# Patient Record
Sex: FEMALE | Race: WHITE | NOT HISPANIC OR LATINO | Employment: OTHER | URBAN - METROPOLITAN AREA
[De-identification: names, ages, dates, MRNs, and addresses within clinical notes are randomized per-mention and may not be internally consistent; named-entity substitution may affect disease eponyms.]

---

## 2023-04-14 ENCOUNTER — TELEPHONE (OUTPATIENT)
Dept: PRIMARY CARE | Facility: CLINIC | Age: 73
End: 2023-04-14
Payer: MEDICARE

## 2023-07-13 ENCOUNTER — APPOINTMENT (OUTPATIENT)
Dept: PRIMARY CARE | Facility: CLINIC | Age: 73
End: 2023-07-13
Payer: MEDICARE

## 2023-07-26 ENCOUNTER — OFFICE VISIT (OUTPATIENT)
Dept: PRIMARY CARE | Facility: CLINIC | Age: 73
End: 2023-07-26
Payer: MEDICARE

## 2023-07-26 VITALS
BODY MASS INDEX: 50.26 KG/M2 | HEART RATE: 85 BPM | DIASTOLIC BLOOD PRESSURE: 70 MMHG | WEIGHT: 266 LBS | SYSTOLIC BLOOD PRESSURE: 124 MMHG

## 2023-07-26 DIAGNOSIS — G40.909 SEIZURE DISORDER (MULTI): ICD-10-CM

## 2023-07-26 DIAGNOSIS — F11.20 CONTINUOUS OPIOID DEPENDENCE (MULTI): ICD-10-CM

## 2023-07-26 DIAGNOSIS — G43.719 INTRACTABLE CHRONIC MIGRAINE WITHOUT AURA AND WITHOUT STATUS MIGRAINOSUS: ICD-10-CM

## 2023-07-26 DIAGNOSIS — Z12.31 ENCOUNTER FOR SCREENING MAMMOGRAM FOR MALIGNANT NEOPLASM OF BREAST: ICD-10-CM

## 2023-07-26 DIAGNOSIS — E66.01 OBESITY, MORBID (MULTI): Primary | ICD-10-CM

## 2023-07-26 PROBLEM — J30.9 ALLERGIC RHINITIS: Status: ACTIVE | Noted: 2023-07-26

## 2023-07-26 PROBLEM — G47.00 INSOMNIA: Status: ACTIVE | Noted: 2023-07-26

## 2023-07-26 PROBLEM — N32.81 OVERACTIVE BLADDER: Status: ACTIVE | Noted: 2023-07-26

## 2023-07-26 PROBLEM — R60.9 EDEMA: Status: ACTIVE | Noted: 2023-07-26

## 2023-07-26 PROBLEM — B37.2 INTERTRIGINOUS CANDIDIASIS: Status: ACTIVE | Noted: 2023-07-26

## 2023-07-26 PROBLEM — F41.9 ANXIETY DISORDER: Status: ACTIVE | Noted: 2023-07-26

## 2023-07-26 PROBLEM — M85.80 OSTEOPENIA: Status: ACTIVE | Noted: 2023-07-26

## 2023-07-26 PROBLEM — E55.9 VITAMIN D DEFICIENCY: Status: ACTIVE | Noted: 2023-07-26

## 2023-07-26 PROBLEM — M54.16 RIGHT LUMBAR RADICULOPATHY: Status: ACTIVE | Noted: 2023-07-26

## 2023-07-26 PROBLEM — M16.10 ARTHRITIS, HIP: Status: ACTIVE | Noted: 2023-07-26

## 2023-07-26 PROBLEM — I87.8 VENOUS STASIS: Status: ACTIVE | Noted: 2023-07-26

## 2023-07-26 PROBLEM — G89.29 CHRONIC LEFT SHOULDER PAIN: Status: ACTIVE | Noted: 2023-07-26

## 2023-07-26 PROBLEM — E78.5 HYPERLIPIDEMIA LDL GOAL <100: Status: ACTIVE | Noted: 2023-07-26

## 2023-07-26 PROBLEM — E03.9 HYPOTHYROIDISM: Status: ACTIVE | Noted: 2023-07-26

## 2023-07-26 PROBLEM — F32.A DEPRESSION: Status: ACTIVE | Noted: 2023-07-26

## 2023-07-26 PROBLEM — L30.9 ECZEMA: Status: ACTIVE | Noted: 2023-07-26

## 2023-07-26 PROBLEM — G25.81 RESTLESS LEGS SYNDROME: Status: ACTIVE | Noted: 2023-07-26

## 2023-07-26 PROBLEM — J38.3 VOCAL CORD DYSFUNCTION: Status: ACTIVE | Noted: 2023-07-26

## 2023-07-26 PROBLEM — K44.9 HIATAL HERNIA: Status: ACTIVE | Noted: 2023-07-26

## 2023-07-26 PROBLEM — G95.9 CERVICAL MYELOPATHY (MULTI): Status: ACTIVE | Noted: 2023-07-26

## 2023-07-26 PROBLEM — M25.512 CHRONIC LEFT SHOULDER PAIN: Status: ACTIVE | Noted: 2023-07-26

## 2023-07-26 PROBLEM — R42 VERTIGO: Status: ACTIVE | Noted: 2023-07-26

## 2023-07-26 PROCEDURE — 1159F MED LIST DOCD IN RCRD: CPT | Performed by: NURSE PRACTITIONER

## 2023-07-26 PROCEDURE — 99212 OFFICE O/P EST SF 10 MIN: CPT | Performed by: NURSE PRACTITIONER

## 2023-07-26 PROCEDURE — 1157F ADVNC CARE PLAN IN RCRD: CPT | Performed by: NURSE PRACTITIONER

## 2023-07-26 PROCEDURE — 1036F TOBACCO NON-USER: CPT | Performed by: NURSE PRACTITIONER

## 2023-07-26 PROCEDURE — 1126F AMNT PAIN NOTED NONE PRSNT: CPT | Performed by: NURSE PRACTITIONER

## 2023-07-26 PROCEDURE — 1160F RVW MEDS BY RX/DR IN RCRD: CPT | Performed by: NURSE PRACTITIONER

## 2023-07-26 RX ORDER — ACETAMINOPHEN 500 MG
TABLET ORAL
COMMUNITY
Start: 2019-05-08 | End: 2023-11-01 | Stop reason: WASHOUT

## 2023-07-26 RX ORDER — AMITRIPTYLINE HYDROCHLORIDE 25 MG/1
50 TABLET, FILM COATED ORAL NIGHTLY
COMMUNITY
End: 2023-10-13 | Stop reason: SDUPTHER

## 2023-07-26 RX ORDER — FUROSEMIDE 40 MG/1
TABLET ORAL
COMMUNITY
Start: 2012-02-20 | End: 2023-11-01 | Stop reason: SDUPTHER

## 2023-07-26 RX ORDER — ASPIRIN 81 MG/1
81 TABLET ORAL
COMMUNITY
Start: 2013-10-17 | End: 2023-11-01 | Stop reason: WASHOUT

## 2023-07-26 RX ORDER — LEVOTHYROXINE SODIUM 137 UG/1
TABLET ORAL
COMMUNITY
Start: 2012-05-12 | End: 2023-11-01 | Stop reason: WASHOUT

## 2023-07-26 RX ORDER — KETOROLAC TROMETHAMINE 10 MG/1
TABLET, FILM COATED ORAL
COMMUNITY

## 2023-07-26 RX ORDER — TIZANIDINE 4 MG/1
TABLET ORAL
COMMUNITY
Start: 2022-03-25 | End: 2023-11-20 | Stop reason: SDUPTHER

## 2023-07-26 RX ORDER — CETIRIZINE HYDROCHLORIDE 10 MG/1
10 TABLET ORAL DAILY
COMMUNITY
End: 2024-02-06 | Stop reason: SDUPTHER

## 2023-07-26 RX ORDER — DICYCLOMINE HYDROCHLORIDE 10 MG/1
CAPSULE ORAL
COMMUNITY
Start: 2022-03-24 | End: 2023-12-20 | Stop reason: SDUPTHER

## 2023-07-26 RX ORDER — SERTRALINE HYDROCHLORIDE 25 MG/1
25 TABLET, FILM COATED ORAL NIGHTLY
COMMUNITY
End: 2024-01-31 | Stop reason: SDUPTHER

## 2023-07-26 RX ORDER — OXYBUTYNIN CHLORIDE 5 MG/1
5 TABLET ORAL 3 TIMES DAILY
COMMUNITY
End: 2024-02-01 | Stop reason: SDUPTHER

## 2023-07-26 RX ORDER — KETOROLAC TROMETHAMINE 15.75 MG/1
1 SPRAY, METERED NASAL
COMMUNITY
Start: 2022-07-22 | End: 2024-03-15

## 2023-07-26 RX ORDER — KETOCONAZOLE 20 MG/G
CREAM TOPICAL
COMMUNITY
End: 2023-11-01 | Stop reason: WASHOUT

## 2023-07-26 RX ORDER — ROSUVASTATIN CALCIUM 20 MG/1
1 TABLET, COATED ORAL DAILY
COMMUNITY
Start: 2020-11-05 | End: 2023-11-01 | Stop reason: WASHOUT

## 2023-07-26 RX ORDER — UBROGEPANT 100 MG/1
TABLET ORAL
COMMUNITY
Start: 2022-03-25 | End: 2023-11-01 | Stop reason: SDUPTHER

## 2023-07-26 RX ORDER — TRIAMCINOLONE ACETONIDE 1 MG/G
CREAM TOPICAL 3 TIMES DAILY PRN
COMMUNITY

## 2023-07-26 RX ORDER — TRAMADOL HYDROCHLORIDE 50 MG/1
TABLET ORAL
COMMUNITY
Start: 2022-08-11

## 2023-07-26 RX ORDER — MUPIROCIN 20 MG/G
OINTMENT TOPICAL
COMMUNITY
Start: 2023-01-17 | End: 2023-07-27 | Stop reason: SDUPTHER

## 2023-07-26 RX ORDER — POTASSIUM CHLORIDE 750 MG/1
TABLET, FILM COATED, EXTENDED RELEASE ORAL
COMMUNITY
Start: 2013-10-17 | End: 2023-11-01 | Stop reason: WASHOUT

## 2023-07-26 RX ORDER — MULTIVITAMIN
TABLET ORAL
COMMUNITY
Start: 2021-03-03

## 2023-07-26 RX ORDER — ACETAZOLAMIDE 250 MG/1
250 TABLET ORAL NIGHTLY
COMMUNITY
End: 2024-02-21 | Stop reason: SDUPTHER

## 2023-07-26 RX ORDER — LANOLIN ALCOHOL/MO/W.PET/CERES
1 CREAM (GRAM) TOPICAL DAILY
COMMUNITY
Start: 2019-05-08

## 2023-07-26 RX ORDER — ROPINIROLE 3 MG/1
TABLET, FILM COATED ORAL 2 TIMES DAILY
COMMUNITY
Start: 2012-08-07 | End: 2023-09-06 | Stop reason: SDUPTHER

## 2023-07-26 NOTE — PROGRESS NOTES
Subjective   Chief Complaint   Patient presents with    Follow-up     Khadra is here today for F/U       Patient ID: Khadra Earl is a 72 y.o. female who presents for Follow-up (Khadra is here today for F/U).    HPI  Est 71 yo female presents today for hospital d/c follow up    Pt states she went to [facility] ED on 6/29/23 for c/o muscle tightness on both sides of her neck and headache. She says she was scared b/c she could not move her head  She was given Benadryl, Toradol and Zofran IV   CT head/spine---> NEGATIVE   Neuro consulted while in ED, Dr. Ponce and pt was cleared for home  ED chart reviewed with pt during OV today     She reports today that her symptoms have been controlled  She states 1 other occurrence and she took a hot shower and it relaxed her muscles   She is currently using Ubrelvy and Sprix PRN for migraines     Pt requesting refill on a cream, but not sure which one, so she will call with the name of it.       Review of Systems  All 13 systems were reviewed and are within normal limits except positive and pertinent negative responses which are noted below or in HPI.      Objective   /70   Pulse 85   Wt 121 kg (266 lb)   BMI 50.26 kg/m²        Physical Exam  Vitals reviewed.   Constitutional:       Appearance: She is obese.   Cardiovascular:      Pulses: Normal pulses.   Pulmonary:      Effort: Pulmonary effort is normal.   Skin:     Capillary Refill: Capillary refill takes less than 2 seconds.   Psychiatric:         Mood and Affect: Mood normal.         Assessment/Plan   Problem List Items Addressed This Visit       Obesity, morbid (CMS/HCC) - Primary     Other Visit Diagnoses       Encounter for screening mammogram for malignant neoplasm of breast        Relevant Orders    BI mammo bilateral screening tomosynthesis

## 2023-07-27 DIAGNOSIS — L30.9 ECZEMA, UNSPECIFIED TYPE: ICD-10-CM

## 2023-07-27 RX ORDER — MUPIROCIN 20 MG/G
OINTMENT TOPICAL
Qty: 30 G | Refills: 1 | Status: SHIPPED | OUTPATIENT
Start: 2023-07-27

## 2023-09-06 DIAGNOSIS — G47.00 INSOMNIA, UNSPECIFIED TYPE: ICD-10-CM

## 2023-09-06 RX ORDER — ROPINIROLE 3 MG/1
3 TABLET, FILM COATED ORAL NIGHTLY
Qty: 90 TABLET | Refills: 0 | Status: SHIPPED | OUTPATIENT
Start: 2023-09-06 | End: 2023-11-01 | Stop reason: SDUPTHER

## 2023-09-10 PROBLEM — D22.71 MELANOCYTIC NEVI OF RIGHT LOWER LIMB, INCLUDING HIP: Status: ACTIVE | Noted: 2022-03-09

## 2023-09-10 PROBLEM — L90.5 SCAR CONDITION AND FIBROSIS OF SKIN: Status: ACTIVE | Noted: 2022-03-09

## 2023-09-10 PROBLEM — R11.10 REGURGITATION OF FOOD: Status: ACTIVE | Noted: 2023-09-10

## 2023-09-10 PROBLEM — L50.3 DERMATOGRAPHIC URTICARIA: Status: ACTIVE | Noted: 2022-03-09

## 2023-09-10 PROBLEM — G93.2 IIH (IDIOPATHIC INTRACRANIAL HYPERTENSION): Status: ACTIVE | Noted: 2023-09-10

## 2023-09-10 PROBLEM — D22.20 MELANOCYTIC NEVI OF UNSPECIFIED EAR AND EXTERNAL AURICULAR CANAL: Status: ACTIVE | Noted: 2022-03-09

## 2023-09-10 PROBLEM — R40.1 CLOUDED CONSCIOUSNESS: Status: ACTIVE | Noted: 2023-09-10

## 2023-09-10 PROBLEM — D22.30 MELANOCYTIC NEVI OF UNSPECIFIED PART OF FACE: Status: ACTIVE | Noted: 2022-03-09

## 2023-09-10 PROBLEM — K92.0 HEMATEMESIS: Status: ACTIVE | Noted: 2023-09-10

## 2023-09-10 PROBLEM — R13.12 DYSPHAGIA, OROPHARYNGEAL PHASE: Status: ACTIVE | Noted: 2023-09-10

## 2023-09-10 PROBLEM — R07.9 CHEST PAIN: Status: ACTIVE | Noted: 2023-09-10

## 2023-09-10 PROBLEM — L91.8 OTHER HYPERTROPHIC DISORDERS OF THE SKIN: Status: ACTIVE | Noted: 2022-03-09

## 2023-09-10 PROBLEM — L30.4 ERYTHEMA INTERTRIGO: Status: ACTIVE | Noted: 2022-03-09

## 2023-09-10 PROBLEM — Z86.59 HISTORY OF DEPRESSION: Status: ACTIVE | Noted: 2023-09-10

## 2023-09-10 PROBLEM — L28.1 PRURIGO NODULARIS: Status: ACTIVE | Noted: 2022-03-09

## 2023-09-10 PROBLEM — M54.50 ACUTE BILATERAL LOW BACK PAIN: Status: ACTIVE | Noted: 2023-09-10

## 2023-09-10 PROBLEM — R41.82 ALTERED MENTAL STATUS: Status: ACTIVE | Noted: 2023-09-10

## 2023-09-10 PROBLEM — K20.90 ESOPHAGITIS: Status: ACTIVE | Noted: 2023-09-10

## 2023-09-10 PROBLEM — R59.0 CERVICAL LYMPHADENOPATHY: Status: ACTIVE | Noted: 2023-09-10

## 2023-09-10 PROBLEM — Z85.820 HISTORY OF MALIGNANT MELANOMA: Status: ACTIVE | Noted: 2022-03-09

## 2023-09-10 PROBLEM — M79.89 PAIN AND SWELLING OF LEFT LOWER LEG: Status: ACTIVE | Noted: 2023-09-10
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
## 2023-09-10 PROBLEM — L30.9 DERMATITIS, UNSPECIFIED: Status: ACTIVE | Noted: 2022-03-09

## 2023-09-10 PROBLEM — R49.0 HOARSENESS: Status: ACTIVE | Noted: 2023-09-10

## 2023-09-10 PROBLEM — K59.00 CONSTIPATION: Status: ACTIVE | Noted: 2023-09-10

## 2023-09-10 PROBLEM — L57.0 ACTINIC KERATOSIS: Status: ACTIVE | Noted: 2022-03-09

## 2023-09-10 PROBLEM — R51.9 HEADACHE: Status: ACTIVE | Noted: 2023-09-10

## 2023-09-10 PROBLEM — D23.9 OTHER BENIGN NEOPLASM OF SKIN, UNSPECIFIED: Status: ACTIVE | Noted: 2022-03-09

## 2023-09-10 PROBLEM — I83.10 VARICOSE VEINS OF UNSPECIFIED LOWER EXTREMITY WITH INFLAMMATION: Status: ACTIVE | Noted: 2022-03-09

## 2023-09-10 PROBLEM — I89.0 LYMPHEDEMA: Status: ACTIVE | Noted: 2023-09-10

## 2023-09-10 PROBLEM — F44.5 PSYCHOGENIC NONEPILEPTIC SEIZURE: Status: ACTIVE | Noted: 2023-09-10

## 2023-09-10 PROBLEM — R21 RASH AND OTHER NONSPECIFIC SKIN ERUPTION: Status: ACTIVE | Noted: 2022-03-09

## 2023-09-10 PROBLEM — I87.2 VENOUS INSUFFICIENCY: Status: ACTIVE | Noted: 2023-09-10

## 2023-09-10 PROBLEM — M25.569 KNEE PAIN: Status: ACTIVE | Noted: 2023-09-10

## 2023-09-10 PROBLEM — J04.0 LARYNGITIS: Status: ACTIVE | Noted: 2023-09-10

## 2023-09-10 PROBLEM — K92.1 HEMATOCHEZIA: Status: ACTIVE | Noted: 2023-09-10

## 2023-09-10 PROBLEM — S39.012A STRAIN OF LUMBAR REGION: Status: ACTIVE | Noted: 2023-09-10

## 2023-09-10 PROBLEM — R60.9 LIPEDEMA: Status: ACTIVE | Noted: 2023-09-10

## 2023-09-10 PROBLEM — M79.89 PAIN AND SWELLING OF RIGHT LOWER LEG: Status: ACTIVE | Noted: 2023-09-10

## 2023-09-10 PROBLEM — Q82.8 OTHER SPECIFIED CONGENITAL MALFORMATIONS OF SKIN: Status: ACTIVE | Noted: 2022-03-09

## 2023-09-10 PROBLEM — D22.5 MELANOCYTIC NEVI OF TRUNK: Status: ACTIVE | Noted: 2022-03-09

## 2023-09-10 PROBLEM — D22.60 MELANOCYTIC NEVI OF UNSPECIFIED UPPER LIMB, INCLUDING SHOULDER: Status: ACTIVE | Noted: 2022-03-09

## 2023-09-10 PROBLEM — Z79.899 POLYPHARMACY: Status: ACTIVE | Noted: 2023-09-10

## 2023-09-10 PROBLEM — M54.32 LEFT SIDED SCIATICA: Status: ACTIVE | Noted: 2023-09-10

## 2023-09-10 PROBLEM — I87.323 CHRONIC VENOUS HYPERTENSION (IDIOPATHIC) WITH INFLAMMATION OF BILATERAL LOWER EXTREMITY: Status: ACTIVE | Noted: 2022-03-09

## 2023-09-10 PROBLEM — M25.579 ANKLE PAIN: Status: ACTIVE | Noted: 2023-09-10

## 2023-09-10 PROBLEM — S99.919A INJURY OF ANKLE: Status: ACTIVE | Noted: 2023-09-10

## 2023-09-10 PROBLEM — L67.8 ABNORMAL FACIAL HAIR: Status: ACTIVE | Noted: 2023-09-10

## 2023-09-10 PROBLEM — D22.4 MELANOCYTIC NEVI OF SCALP AND NECK: Status: ACTIVE | Noted: 2022-03-09

## 2023-09-10 PROBLEM — L82.1 OTHER SEBORRHEIC KERATOSIS: Status: ACTIVE | Noted: 2022-03-09

## 2023-09-10 PROBLEM — M79.661 PAIN AND SWELLING OF RIGHT LOWER LEG: Status: ACTIVE | Noted: 2023-09-10

## 2023-09-10 PROBLEM — L81.4 OTHER MELANIN HYPERPIGMENTATION: Status: ACTIVE | Noted: 2022-03-09

## 2023-09-10 PROBLEM — M79.18 MYOFASCIAL PAIN: Status: ACTIVE | Noted: 2023-09-10

## 2023-09-10 PROBLEM — M54.12 CHRONIC CERVICAL RADICULOPATHY: Status: ACTIVE | Noted: 2023-09-10

## 2023-09-10 PROBLEM — S39.92XA LOWER BACK INJURY: Status: ACTIVE | Noted: 2023-09-10

## 2023-09-10 PROBLEM — R05.8 DRY COUGH: Status: ACTIVE | Noted: 2023-09-10

## 2023-09-10 PROBLEM — R53.83 LETHARGY: Status: ACTIVE | Noted: 2023-09-10

## 2023-09-10 PROBLEM — M53.3 SACROILIAC JOINT DYSFUNCTION OF LEFT SIDE: Status: ACTIVE | Noted: 2023-09-10

## 2023-09-10 PROBLEM — F09 COGNITIVE DYSFUNCTION: Status: ACTIVE | Noted: 2023-09-10

## 2023-09-10 PROBLEM — M79.662 PAIN AND SWELLING OF LEFT LOWER LEG: Status: ACTIVE | Noted: 2023-09-10

## 2023-09-10 PROBLEM — D18.01 HEMANGIOMA OF SKIN AND SUBCUTANEOUS TISSUE: Status: ACTIVE | Noted: 2022-03-09

## 2023-09-10 RX ORDER — BACLOFEN 10 MG/1
10 TABLET ORAL AS NEEDED
COMMUNITY

## 2023-09-10 RX ORDER — DEXLANSOPRAZOLE 60 MG/1
60 CAPSULE, DELAYED RELEASE ORAL DAILY
COMMUNITY
End: 2023-11-01 | Stop reason: WASHOUT

## 2023-09-10 RX ORDER — ACETAMINOPHEN 325 MG/1
3 TABLET ORAL EVERY 8 HOURS
COMMUNITY
Start: 2023-01-17 | End: 2023-11-01 | Stop reason: WASHOUT

## 2023-09-10 RX ORDER — ROPINIROLE 2 MG/1
2 TABLET, FILM COATED ORAL DAILY
COMMUNITY
End: 2023-11-14 | Stop reason: ALTCHOICE

## 2023-09-10 RX ORDER — LAMOTRIGINE 25(42)-100
KIT ORAL
COMMUNITY
Start: 2023-07-28 | End: 2023-11-01 | Stop reason: WASHOUT

## 2023-09-10 RX ORDER — LOVASTATIN 40 MG/1
40 TABLET ORAL DAILY
COMMUNITY

## 2023-09-10 RX ORDER — LEVOTHYROXINE SODIUM 125 UG/1
125 TABLET ORAL DAILY
COMMUNITY
End: 2024-02-19 | Stop reason: SDUPTHER

## 2023-09-10 RX ORDER — ROPINIROLE 3 MG/1
1 TABLET, FILM COATED ORAL EVERY 8 HOURS
COMMUNITY
Start: 2012-08-07 | End: 2023-11-14 | Stop reason: SDUPTHER

## 2023-09-10 RX ORDER — LAMOTRIGINE 100 MG/1
100 TABLET ORAL DAILY
COMMUNITY
End: 2023-11-01 | Stop reason: WASHOUT

## 2023-09-10 RX ORDER — KETOCONAZOLE 20 MG/G
1 CREAM TOPICAL DAILY
COMMUNITY

## 2023-09-10 RX ORDER — POTASSIUM CHLORIDE 750 MG/1
1 TABLET, FILM COATED, EXTENDED RELEASE ORAL 3 TIMES DAILY
COMMUNITY
Start: 2020-01-27 | End: 2023-11-28 | Stop reason: SDUPTHER

## 2023-09-10 RX ORDER — HYDROCODONE BITARTRATE AND ACETAMINOPHEN 10; 325 MG/1; MG/1
1 TABLET ORAL AS NEEDED
COMMUNITY
End: 2023-11-01 | Stop reason: WASHOUT

## 2023-09-10 RX ORDER — POTASSIUM CHLORIDE 600 MG/1
8 TABLET, FILM COATED, EXTENDED RELEASE ORAL 2 TIMES DAILY
COMMUNITY
End: 2023-11-01 | Stop reason: WASHOUT

## 2023-09-10 RX ORDER — BENZONATATE 100 MG/1
100 CAPSULE ORAL AS NEEDED
COMMUNITY

## 2023-09-10 RX ORDER — FLUOCINONIDE 0.5 MG/G
1 CREAM TOPICAL
COMMUNITY
Start: 2018-04-03

## 2023-09-10 RX ORDER — FUROSEMIDE 40 MG/1
20 TABLET ORAL DAILY PRN
COMMUNITY
Start: 2012-02-20 | End: 2023-11-01 | Stop reason: SDUPTHER

## 2023-09-10 RX ORDER — MECLIZINE HYDROCHLORIDE 25 MG/1
25 TABLET ORAL EVERY 8 HOURS PRN
COMMUNITY

## 2023-09-26 DIAGNOSIS — G43.711 CHRONIC MIGRAINE WITHOUT AURA, WITH INTRACTABLE MIGRAINE, SO STATED, WITH STATUS MIGRAINOSUS: Primary | ICD-10-CM

## 2023-09-26 RX ORDER — METHYLPREDNISOLONE SODIUM SUCCINATE 40 MG/ML
40 INJECTION INTRAMUSCULAR; INTRAVENOUS AS NEEDED
Status: CANCELLED | OUTPATIENT
Start: 2023-10-01

## 2023-09-26 RX ORDER — DIPHENHYDRAMINE HYDROCHLORIDE 50 MG/ML
50 INJECTION INTRAMUSCULAR; INTRAVENOUS AS NEEDED
Status: CANCELLED | OUTPATIENT
Start: 2023-10-01

## 2023-09-26 RX ORDER — EPINEPHRINE 0.3 MG/.3ML
0.3 INJECTION SUBCUTANEOUS EVERY 5 MIN PRN
Status: CANCELLED | OUTPATIENT
Start: 2023-10-01

## 2023-09-26 RX ORDER — ALBUTEROL SULFATE 0.83 MG/ML
3 SOLUTION RESPIRATORY (INHALATION) AS NEEDED
Status: CANCELLED | OUTPATIENT
Start: 2023-10-01

## 2023-09-26 RX ORDER — FAMOTIDINE 10 MG/ML
20 INJECTION INTRAVENOUS ONCE AS NEEDED
Status: CANCELLED | OUTPATIENT
Start: 2023-10-01

## 2023-10-03 PROBLEM — G43.119 INTRACTABLE MIGRAINE WITH AURA WITHOUT STATUS MIGRAINOSUS: Status: ACTIVE | Noted: 2023-10-03

## 2023-10-03 RX ORDER — HEPARIN 100 UNIT/ML
500 SYRINGE INTRAVENOUS AS NEEDED
Status: CANCELLED | OUTPATIENT
Start: 2023-10-03

## 2023-10-03 RX ORDER — HEPARIN SODIUM,PORCINE/PF 10 UNIT/ML
50 SYRINGE (ML) INTRAVENOUS AS NEEDED
Status: CANCELLED | OUTPATIENT
Start: 2023-10-03

## 2023-10-13 ENCOUNTER — INFUSION (OUTPATIENT)
Dept: HEMATOLOGY/ONCOLOGY | Facility: CLINIC | Age: 73
End: 2023-10-13
Payer: MEDICARE

## 2023-10-13 ENCOUNTER — HOSPITAL ENCOUNTER (OUTPATIENT)
Dept: RADIOLOGY | Facility: HOSPITAL | Age: 73
Discharge: HOME | End: 2023-10-13
Payer: MEDICARE

## 2023-10-13 VITALS
HEIGHT: 62 IN | TEMPERATURE: 98.2 F | DIASTOLIC BLOOD PRESSURE: 82 MMHG | SYSTOLIC BLOOD PRESSURE: 139 MMHG | BODY MASS INDEX: 51.97 KG/M2 | RESPIRATION RATE: 16 BRPM | WEIGHT: 282.41 LBS | HEART RATE: 56 BPM | OXYGEN SATURATION: 100 %

## 2023-10-13 DIAGNOSIS — G43.711 INTRACTABLE CHRONIC MIGRAINE WITHOUT AURA AND WITH STATUS MIGRAINOSUS: ICD-10-CM

## 2023-10-13 DIAGNOSIS — Z12.31 ENCOUNTER FOR SCREENING MAMMOGRAM FOR MALIGNANT NEOPLASM OF BREAST: ICD-10-CM

## 2023-10-13 DIAGNOSIS — G43.119 INTRACTABLE MIGRAINE WITH AURA WITHOUT STATUS MIGRAINOSUS: ICD-10-CM

## 2023-10-13 DIAGNOSIS — G43.711 CHRONIC MIGRAINE WITHOUT AURA, WITH INTRACTABLE MIGRAINE, SO STATED, WITH STATUS MIGRAINOSUS: ICD-10-CM

## 2023-10-13 PROCEDURE — 77063 BREAST TOMOSYNTHESIS BI: CPT | Mod: BILATERAL PROCEDURE | Performed by: RADIOLOGY

## 2023-10-13 PROCEDURE — 2500000004 HC RX 250 GENERAL PHARMACY W/ HCPCS (ALT 636 FOR OP/ED)

## 2023-10-13 PROCEDURE — 77067 SCR MAMMO BI INCL CAD: CPT | Mod: BILATERAL PROCEDURE | Performed by: RADIOLOGY

## 2023-10-13 PROCEDURE — 96365 THER/PROPH/DIAG IV INF INIT: CPT

## 2023-10-13 PROCEDURE — 77063 BREAST TOMOSYNTHESIS BI: CPT | Mod: 50

## 2023-10-13 RX ORDER — DIPHENHYDRAMINE HYDROCHLORIDE 50 MG/ML
50 INJECTION INTRAMUSCULAR; INTRAVENOUS AS NEEDED
Status: CANCELLED | OUTPATIENT
Start: 2024-01-11

## 2023-10-13 RX ORDER — EPINEPHRINE 0.3 MG/.3ML
0.3 INJECTION SUBCUTANEOUS EVERY 5 MIN PRN
Status: DISCONTINUED | OUTPATIENT
Start: 2023-10-13 | End: 2023-10-13 | Stop reason: HOSPADM

## 2023-10-13 RX ORDER — ALBUTEROL SULFATE 0.83 MG/ML
3 SOLUTION RESPIRATORY (INHALATION) AS NEEDED
Status: DISCONTINUED | OUTPATIENT
Start: 2023-10-13 | End: 2023-10-13 | Stop reason: HOSPADM

## 2023-10-13 RX ORDER — ALBUTEROL SULFATE 0.83 MG/ML
3 SOLUTION RESPIRATORY (INHALATION) AS NEEDED
Status: CANCELLED | OUTPATIENT
Start: 2024-01-11

## 2023-10-13 RX ORDER — AMITRIPTYLINE HYDROCHLORIDE 25 MG/1
50 TABLET, FILM COATED ORAL NIGHTLY
Qty: 180 TABLET | Refills: 0 | Status: SHIPPED | OUTPATIENT
Start: 2023-10-13 | End: 2024-05-20 | Stop reason: SDUPTHER

## 2023-10-13 RX ORDER — DIPHENHYDRAMINE HYDROCHLORIDE 50 MG/ML
50 INJECTION INTRAMUSCULAR; INTRAVENOUS AS NEEDED
Status: DISCONTINUED | OUTPATIENT
Start: 2023-10-13 | End: 2023-10-13 | Stop reason: HOSPADM

## 2023-10-13 RX ORDER — FAMOTIDINE 10 MG/ML
20 INJECTION INTRAVENOUS ONCE AS NEEDED
Status: CANCELLED | OUTPATIENT
Start: 2024-01-11

## 2023-10-13 RX ORDER — HEPARIN 100 UNIT/ML
500 SYRINGE INTRAVENOUS AS NEEDED
Status: CANCELLED | OUTPATIENT
Start: 2023-10-13

## 2023-10-13 RX ORDER — EPINEPHRINE 0.3 MG/.3ML
0.3 INJECTION SUBCUTANEOUS EVERY 5 MIN PRN
Status: CANCELLED | OUTPATIENT
Start: 2024-01-11

## 2023-10-13 RX ORDER — FAMOTIDINE 10 MG/ML
20 INJECTION INTRAVENOUS ONCE AS NEEDED
Status: DISCONTINUED | OUTPATIENT
Start: 2023-10-13 | End: 2023-10-13 | Stop reason: HOSPADM

## 2023-10-13 RX ORDER — HEPARIN SODIUM,PORCINE/PF 10 UNIT/ML
50 SYRINGE (ML) INTRAVENOUS AS NEEDED
Status: CANCELLED | OUTPATIENT
Start: 2023-10-13

## 2023-10-13 RX ADMIN — EPTINEZUMAB-JJMR 300 MG: 100 INJECTION INTRAVENOUS at 15:09

## 2023-10-13 ASSESSMENT — ENCOUNTER SYMPTOMS
LOSS OF SENSATION IN FEET: 0
DEPRESSION: 0
OCCASIONAL FEELINGS OF UNSTEADINESS: 1

## 2023-10-13 ASSESSMENT — PAIN SCALES - GENERAL: PAINLEVEL: 8

## 2023-10-13 ASSESSMENT — COLUMBIA-SUICIDE SEVERITY RATING SCALE - C-SSRS
6. HAVE YOU EVER DONE ANYTHING, STARTED TO DO ANYTHING, OR PREPARED TO DO ANYTHING TO END YOUR LIFE?: NO
1. IN THE PAST MONTH, HAVE YOU WISHED YOU WERE DEAD OR WISHED YOU COULD GO TO SLEEP AND NOT WAKE UP?: NO
2. HAVE YOU ACTUALLY HAD ANY THOUGHTS OF KILLING YOURSELF?: NO

## 2023-10-13 ASSESSMENT — PATIENT HEALTH QUESTIONNAIRE - PHQ9
2. FEELING DOWN, DEPRESSED OR HOPELESS: NOT AT ALL
SUM OF ALL RESPONSES TO PHQ9 QUESTIONS 1 AND 2: 0
1. LITTLE INTEREST OR PLEASURE IN DOING THINGS: NOT AT ALL

## 2023-10-31 NOTE — PROGRESS NOTES
"Subjective   Reason for Visit: Khadra Earl is an 72 y.o. female here for a Medicare Wellness visit.     Past Medical, Surgical, and Family History reviewed and updated in chart.    Reviewed all medications by prescribing practitioner or clinical pharmacist (such as prescriptions, OTCs, herbal therapies and supplements) and documented in the medical record.    HPI  71 yo est female presents today for AWV and routine f/u    PMH: ANXIETY, DEPRESSION, LYMPHEDEMA, HYPOTHYROIDISM, RLS, MIGRAINES, SEIZURES     Pt will be seeing ortho next week for her neck     Pt denies any acute c/o today, doing pretty well  No falls   Refuses flu vaccine today      #ANXIETY/DEPRESSION  Rx Amitriptyline  Denies SI/HI      #SEIZURES  Neuro: Dr. York   Rx Divalproex 750 mg daily  Last sz: 2 weeks ago      #LYMPHEDEMA  Saw Dr. Mohseni  Does compression treatments at home 2 times/day      #HYPOTHYROIDISM  TSH=5.16 Jan 2023  Rx Levothyroxine 125 mcg x 6 days/week      #MIGRAINES  Neuro: Dr. York; had injection last month   Rx Ubrelvy, Tizanidine, Sprix nasal     Patient Care Team:  JAVIER Armijo as PCP - General (Family Medicine)  [facility]ributed [facility]  Tre De Leon MD (Internal Medicine)       Review of Systems  All 13 systems were reviewed and are within normal limits except positive and pertinent negative responses which are noted below or in HPI.      Objective   Vitals:  /76   Pulse 77   Ht 1.549 m (5' 1\")   Wt 129 kg (284 lb)   BMI 53.66 kg/m²       Physical Exam  Vitals reviewed.   Constitutional:       Appearance: She is obese.   Cardiovascular:      Rate and Rhythm: Normal rate.      Pulses: Normal pulses.      Heart sounds: Normal heart sounds.   Skin:     General: Skin is warm and dry.   Psychiatric:         Mood and Affect: Mood normal.         Assessment/Plan     Problem List Items Addressed This Visit             ICD-10-CM    Edema R60.9    Relevant " Medications    furosemide (Lasix) 40 mg tablet     Other Visit Diagnoses         Codes    Medicare annual wellness visit, subsequent    -  Primary Z00.00    Routine general medical examination at health care facility     Z00.00

## 2023-11-01 ENCOUNTER — OFFICE VISIT (OUTPATIENT)
Dept: PRIMARY CARE | Facility: CLINIC | Age: 73
End: 2023-11-01
Payer: MEDICARE

## 2023-11-01 VITALS
BODY MASS INDEX: 53.62 KG/M2 | DIASTOLIC BLOOD PRESSURE: 76 MMHG | SYSTOLIC BLOOD PRESSURE: 112 MMHG | HEART RATE: 77 BPM | HEIGHT: 61 IN | WEIGHT: 284 LBS

## 2023-11-01 DIAGNOSIS — R60.1 GENERALIZED EDEMA: ICD-10-CM

## 2023-11-01 DIAGNOSIS — Z00.00 MEDICARE ANNUAL WELLNESS VISIT, SUBSEQUENT: Primary | ICD-10-CM

## 2023-11-01 DIAGNOSIS — Z00.00 ROUTINE GENERAL MEDICAL EXAMINATION AT HEALTH CARE FACILITY: ICD-10-CM

## 2023-11-01 PROCEDURE — 1125F AMNT PAIN NOTED PAIN PRSNT: CPT | Performed by: NURSE PRACTITIONER

## 2023-11-01 PROCEDURE — 1170F FXNL STATUS ASSESSED: CPT | Performed by: NURSE PRACTITIONER

## 2023-11-01 PROCEDURE — 1159F MED LIST DOCD IN RCRD: CPT | Performed by: NURSE PRACTITIONER

## 2023-11-01 PROCEDURE — 1036F TOBACCO NON-USER: CPT | Performed by: NURSE PRACTITIONER

## 2023-11-01 PROCEDURE — 1160F RVW MEDS BY RX/DR IN RCRD: CPT | Performed by: NURSE PRACTITIONER

## 2023-11-01 PROCEDURE — G0439 PPPS, SUBSEQ VISIT: HCPCS | Performed by: NURSE PRACTITIONER

## 2023-11-01 RX ORDER — FUROSEMIDE 40 MG/1
20 TABLET ORAL DAILY PRN
Qty: 60 TABLET | Refills: 0 | Status: SHIPPED | OUTPATIENT
Start: 2023-11-01

## 2023-11-01 ASSESSMENT — ACTIVITIES OF DAILY LIVING (ADL)
GROCERY_SHOPPING: INDEPENDENT
DRESSING: INDEPENDENT
MANAGING_FINANCES: INDEPENDENT
BATHING: INDEPENDENT
DOING_HOUSEWORK: INDEPENDENT
TAKING_MEDICATION: INDEPENDENT

## 2023-11-01 ASSESSMENT — PATIENT HEALTH QUESTIONNAIRE - PHQ9
SUM OF ALL RESPONSES TO PHQ9 QUESTIONS 1 AND 2: 0
2. FEELING DOWN, DEPRESSED OR HOPELESS: NOT AT ALL
1. LITTLE INTEREST OR PLEASURE IN DOING THINGS: NOT AT ALL

## 2023-11-01 ASSESSMENT — ENCOUNTER SYMPTOMS
OCCASIONAL FEELINGS OF UNSTEADINESS: 0
LOSS OF SENSATION IN FEET: 0
DEPRESSION: 0

## 2023-11-01 NOTE — PATIENT INSTRUCTIONS
Thank you for seeing me today.  It was a pleasure to see you again!    Today we did your Annual Medicare Wellness Exam and discussed the following:     Call if you need refills     F/U with specialists as needed    RTC MARCH 2024

## 2023-11-06 ENCOUNTER — OFFICE VISIT (OUTPATIENT)
Dept: ORTHOPEDIC SURGERY | Facility: CLINIC | Age: 73
End: 2023-11-06
Payer: MEDICARE

## 2023-11-06 ENCOUNTER — ANCILLARY PROCEDURE (OUTPATIENT)
Dept: RADIOLOGY | Facility: CLINIC | Age: 73
End: 2023-11-06
Payer: MEDICARE

## 2023-11-06 DIAGNOSIS — M54.2 CERVICALGIA: ICD-10-CM

## 2023-11-06 DIAGNOSIS — M47.812 CERVICAL SPONDYLOSIS WITHOUT MYELOPATHY: ICD-10-CM

## 2023-11-06 DIAGNOSIS — M47.812 CERVICAL SPONDYLOSIS WITHOUT MYELOPATHY: Primary | ICD-10-CM

## 2023-11-06 PROCEDURE — 1125F AMNT PAIN NOTED PAIN PRSNT: CPT | Performed by: PHYSICAL MEDICINE & REHABILITATION

## 2023-11-06 PROCEDURE — 1036F TOBACCO NON-USER: CPT | Performed by: PHYSICAL MEDICINE & REHABILITATION

## 2023-11-06 PROCEDURE — 1160F RVW MEDS BY RX/DR IN RCRD: CPT | Performed by: PHYSICAL MEDICINE & REHABILITATION

## 2023-11-06 PROCEDURE — 99203 OFFICE O/P NEW LOW 30 MIN: CPT | Performed by: PHYSICAL MEDICINE & REHABILITATION

## 2023-11-06 PROCEDURE — 72050 X-RAY EXAM NECK SPINE 4/5VWS: CPT | Performed by: RADIOLOGY

## 2023-11-06 PROCEDURE — 1159F MED LIST DOCD IN RCRD: CPT | Performed by: PHYSICAL MEDICINE & REHABILITATION

## 2023-11-06 PROCEDURE — 72050 X-RAY EXAM NECK SPINE 4/5VWS: CPT | Mod: FY

## 2023-11-06 NOTE — PROGRESS NOTES
New Consult/New Patient Note    11/6/2023      Assessment: Very pleasant 72-year-old female with chronic primarily right cervical pain.  History of C4-6 ACDF with Dr. Bearden.  Pain is predominantly axial at this time.  -Cervical spondylosis with facet arthropathy and possible adjacent segment disease  -Significant component of myofascial pain/cervicalgia    PLAN:  1)  Imaging/Diagnostic Studies: Reviewed most recent cervical x-rays which were just over 2 years ago as well as most recent cervical CT scan.  We will obtain cervical x-rays to further assess.  2)  Therapy/Rehabilitation: New consult provided for physical therapy  3)  Pharmacological Management: Agree with tizanidine as needed.  Advised over-the-counter Tylenol and Lidoderm patch usage.  4)  Spine/Surgical Interventions: May benefit from cervical facet/medial branch blocks  5)  Alternative Treatments: May consider alternative treatment options in the future including manipulation (chiropractor versus osteopathic) and/or acupuncture if patient does not obtain optimal relief with initial treatment plan.  6)  Consultations: Physical therapy  7)  Follow -up: 4-6 weeks or PRN if symptoms worsen/do not improve.   8)  Future treatment considerations: Can consider cervical medial branch blocks in preparation for RFA    Patient advised of the difference between hurt and harm and advised to continue with all normal activities and exercises. Patient verbalized understanding of the above plan and was happy with the care provided.      The above clinical summary has been dictated with voice recognition software. It has not been proofread for grammatical errors, typographical mistakes, or other semantic inconsistencies.    Thank you for visiting our office today. It was our pleasure to take part in your healthcare.     Do not hesitate to call with any questions regarding your plan of care after leaving at [phone]    To clinicians, thank you very much for this  kind referral. It is a privilege to partner with you in the care of your patients. My office would be delighted to assist you with any further consultations or with questions regarding the plan of care outlined. Do not hesitate to call the office or contact me directly.     Sincerely,    STANLEY Simpson MD  , Physical Medicine and Rehabilitation, Orthopedic Spine  [employer] [facility] [employer]  [facility] [place]         Khadra Earl   is a 72 y.o. female who presents with chronic right cervical pain.  Hx of cervical surgery with Dr. Bearden.    Location:  Primary area of pain is at the right cervical area  Radiation:  right shoulder, no numbness or weakness.  No sig. Radicular pain  Quality: achy, sharp current 4/10,  at its worst 9/10  Exacerbated by cervical rotation  Relieved by heat, warm water  Onset, traumatic event:  no recent  Has tried:  PT in the past, heat.  Amitriptyline, Tizanidine, Tramadol    Patient denies bowel/bladder incontinence, denies fever, denies unintentional weight loss, denies clumsiness of hands, feet, or dropping things.  Denies any constitutional or myelopathic symptomatology.      PREVIOUS TREATMENTS  IN THE LAST SIX MONTHS     Active conservative therapy  in the last six months (see below)              1. Physical therapy:  no                                                                                   2. Home exercise program after PT: yes                                                    3. A physician supervised home exercise program (HEP): no                4. Chiropractic Care:  no                                                                    Passive conservative therapy  in the last six months (see below)              1. NSAIDS: avoiding                                                                                                       2. Prescription pain medication:  tizanidine, tramadol                                                              3. Acupuncture:      no                                                                                       4. Tens unit: no     Assistive Devices: none    Work status: retired     ROS: Other than listed in HPI, PMHX below, and intake paperwork including a 30 point patient-recorded review of symptoms which was personally reviewed and inclusive of no history of unintentional weight loss, change in appetite, significant malaise, fevers, chills, or change in bowel/bladder, shortness of breath, or chest pain.    I have confirmed and edited as necessary Past Medical, Past Surgical, Family, Social History and ROS as obtained by others. These were also obtained on new patient forms.      PHYSICAL EXAM:   GENERAL APPEARANCE:  Well nourished, well developed, and no apparent distress.  NEURO PSYCH: Patient oriented to person, place, Mood pleasant. Benign affect.  MUSCULOSKELETAL and NEUROLOGICAL       VISUAL INSPECTION          CERVICAL: WNL  SPINE ROM:   CERVICAL ROM: Limited rotation to the right with reproduction of pain      PALPATION:           SPINOUS PROCESS: Nontender midline           PARASPINALS: Tenderness to right lumbar and cervical paraspinals as well as upper trapezius  FACET LOADING: Positive right cervical  MUSCLE BULK: Normal and symmetrical in the upper & lower extremities.  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 pain with overhead activity    DATA REVIEW:   The below imaging studies were personally reviewed and discussed with the patient.    Medical Decision Making:  The above note  constitutes a Moderate to High level of medical decision making based on past data and imaging review, new and chronic symptoms with exacerbation, change in weakness or sensation, new imaging and diagnostic studies ordered, discussion of potential interventional or surgical treatment options, acute or chronic pain that may pose a threat to bodily function.    Past Medical History:   Diagnosis Date    Acute bronchitis, unspecified 04/18/2022    Acute bronchitis and bronchiolitis    Bariatric surgery status 10/28/2020    Bariatric surgery status    Other chest pain 04/18/2022    Chest pressure    Other conditions influencing health status 04/18/2022    History of cough    Personal history of malignant melanoma of skin     History of malignant melanoma    Personal history of other specified conditions 06/07/2021    History of lymphadenopathy    Personal history of other specified conditions 03/18/2022    History of hoarseness       Medication Documentation Review Audit       Reviewed by Samuel Simpson MD (Physician) on 11/06/23 at 1146      Medication Order Taking? Sig Documenting Provider Last Dose Status   Discontinued 11/01/23 1051   acetaZOLAMIDE (Diamox) 250 mg tablet [id number] No Take 1 tablet (250 mg) by mouth once daily at bedtime. Historical Provider, MD Taking Active   amitriptyline (Elavil) 25 mg tablet [id number] No Take 2 tablets (50 mg) by mouth once daily at bedtime. Quyen York MD Taking Active   Discontinued 11/01/23 1053   baclofen (Lioresal) 10 mg tablet [id number] No Take 1 tablet (10 mg) by mouth if needed for muscle spasms. With food or milk 1-5x daily Historical MD Charles Not Taking Active   benzonatate (Tessalon) 100 mg capsule [id number] No Take 1 capsule (100 mg) by mouth if needed for cough. Marisa Provider, MD Taking Active   cetirizine (ZyrTEC) 10 mg tablet [id number] No Take 1 tablet (10 mg) by mouth once daily. Marisa Provider, MD Taking Active   Discontinued 11/01/23 1048    cholecalciferol, vitamin D3, (VITAMIN D3 ORAL) [id number] No Take 1 capsule by mouth once daily. Vitamin D-3 1000 UNIT Historical Provider, MD Taking Active   cyanocobalamin (Vitamin B-12) 1,000 mcg tablet [id number] No Take 1 tablet (1,000 mcg) by mouth once daily. Historical Provider, MD Taking Active   Discontinued 11/01/23 1054   dicyclomine (Bentyl) 10 mg capsule [id number] No Take by mouth. Marisa Provider, MD Taking Active   eptinezumab (Vyepti) injection [id number] No Infuse 3 mL (300 mg total) into a venous catheter every 3 months. Quyen York MD Taking Active   eptinezumab (Vyepti) injection [id number] No Infuse 3 mL (300 mg total) into a venous catheter every 3 months. Quyen York MD Taking Active   fluocinonide 0.05 % cream [id number] No Apply 1 Application topically. Historical Provider, MD Taking Active   Discontinued 11/01/23 1055   Discontinued 11/01/23 1100   furosemide (Lasix) 40 mg tablet [id number]  Take 0.5 tablets (20 mg) by mouth once daily as needed (leg swelling). India Houston APRN-CNP  Active   Discontinued 11/01/23 1056   Discontinued 11/01/23 1056   ketoconazole (NIZOral) 2 % cream [id number] No Apply 1 Application topically once daily. To affected area externally Historical Provider, MD Taking Active   ketorolac (Toradol) 10 mg tablet [id number] No TAKE 1 TABLET BY MOUTH EVERY 6 HOURS WITH FOOD Historical Provider, MD Taking Active   Discontinued 11/01/23 1058   Discontinued 11/01/23 1056   Discontinued 11/01/23 1056   levothyroxine (Synthroid, Levoxyl) 125 mcg tablet [id number] No Take 1 tablet (125 mcg) by mouth once daily. Marisa Provider, MD Not Taking Active   Discontinued 11/01/23 1057   lovastatin (Mevacor) 40 mg tablet [id number] No Take 1 tablet (40 mg) by mouth once daily. With a meal Historical MD Charles Taking Active   meclizine (Antivert) 25 mg tablet [id number] No Take 1 tablet (25 mg) by mouth every 8 hours if needed for dizziness. Historical Provider, MD Taking  Active   multivitamin (Multiple Vitamins) tablet [id number] No Take by mouth. Historical Provider, MD Taking Active   mupirocin (Bactroban) 2 % ointment [id number] No APPLY SMALL AMOUNT TOPICALLY TO THE AFFECTED AREA THREE TIMES DAILY AS DIRECTED JAVIER Armijo Taking Active   oxybutynin (Ditropan) 5 mg tablet [id number] No Take 1 tablet (5 mg) by mouth 3 times a day. Historical MD Charles Taking Active   Discontinued 11/01/23 1058   potassium chloride CR 10 mEq ER tablet [id number] No Take 1 tablet (10 mEq) by mouth 3 times a day. Historical Provider, MD Taking Active   rOPINIRole (Requip) 2 mg tablet [id number] No Take 1 tablet (2 mg) by mouth once daily. For 90 days Marisa Soto MD Taking Active   Discontinued 11/01/23 1058   rOPINIRole (Requip) 3 mg tablet [id number] No Take 1 tablet (3 mg) by mouth every 8 hours. Historical MD Charles Taking Active   Discontinued 11/01/23 1059   sertraline (Zoloft) 25 mg tablet [id number] No Take 1 tablet (25 mg) by mouth once daily at bedtime. Historical MD Charles Taking Active   Sprix nasal [id number] No Administer 1 spray into each nostril. 6-8 hrs. Max dose 8 sprays daily Historical Provider, MD Taking Active   tiZANidine (Zanaflex) 4 mg tablet [id number] No Take by mouth. Historical Provider, MD Taking Active   traMADol (Ultram) 50 mg tablet [id number] No TAKE 1 TO 2 TABLETS BY MOUTH EVERY 6 HOURS AS NEEDED FOR BREAKTHOUGH PAIN. Historical Provider, MD Taking Active   triamcinolone (Kenalog) 0.1 % cream [id number] No Apply topically 3 times a day as needed. Historical MD Charles Taking Active   Discontinued 11/01/23 1100   ubrogepant (Ubrelvy) 100 mg tablet tablet [id number] No TAKE ONE (1) TABLET BY MOUTH AT ONSET OF MIGRAINE. MAY REPEAT DOSE IN 2 HOURS IF NEEDED. MAXIMUM  MG (2 TABLETS) IN A 24 HOUR PERIOD. Quyen York MD Taking Active                    Allergies   Allergen Reactions    Zolpidem Other     Severe loss of balance hearing and SI . Does  not work    Other Nausea/vomiting     Local anesthesia    Gabapentin Other     SEVERE GAIT ISSUES    Magnesium Unknown    Magnesium Carbonate Hallucinations    Metoclopramide Hcl Unknown     akathisia    Sulfa (Sulfonamide Antibiotics) Unknown    Adhesive Tape-Silicones Rash       Social History     Socioeconomic History    Marital status:      Spouse name: Not on file    Number of children: Not on file    Years of education: Not on file    Highest education level: Not on file   Occupational History    Not on file   Tobacco Use    Smoking status: Never     Passive exposure: Never    Smokeless tobacco: Never   Vaping Use    Vaping Use: Never used   Substance and Sexual Activity    Alcohol use: Never    Drug use: Never    Sexual activity: Not on file   Other Topics Concern    Not on file   Social History Narrative    Not on file     Social Determinants of Health     Financial Resource Strain: Not on file   Food Insecurity: Not on file   Transportation Needs: Not on file   Physical Activity: Not on file   Stress: Not on file   Social Connections: Not on file   Intimate Partner Violence: Not on file   Housing Stability: Not on file       Past Surgical History:   Procedure Laterality Date    ADENOIDECTOMY  02/01/2018    Adenoidectomy    APPENDECTOMY  04/10/2014    Appendectomy    BREAST BIOPSY Bilateral     pt states hx of bilat benign bxs    BREAST LUMPECTOMY  04/10/2014    Breast Surgery Lumpectomy    CHOLECYSTECTOMY  04/10/2014    Cholecystectomy    CT GUIDED IMAGING FOR NEEDLE PLACEMENT  06/11/2012    CT GUIDED IMAGING FOR NEEDLE PLACEMENT [facility]    DILATION AND CURETTAGE OF UTERUS  04/10/2014    Dilation And Curettage    HEMICOLECTOMY  04/10/2014    Hemicolectomy    HERNIA REPAIR  04/10/2014    Hernia Repair    LYMPH NODE BIOPSY  04/10/2014    Biopsy Lymph Node    OTHER SURGICAL HISTORY  04/10/2014    Excision Of Lesion Trunk    OTHER SURGICAL HISTORY  04/10/2014    Excision Of Lesion Ears  Benign Over 4cm    OTHER SURGICAL HISTORY  08/10/2021    Cervical surgery    OTHER SURGICAL HISTORY  02/16/2022    Cataract surgery    SMALL INTESTINE SURGERY  04/10/2014    Small Bowel Resection    TONSILLECTOMY  04/10/2014    Tonsillectomy    TOTAL KNEE ARTHROPLASTY  04/10/2014    Knee Replacement

## 2023-11-14 DIAGNOSIS — G25.81 RESTLESS LEGS SYNDROME: ICD-10-CM

## 2023-11-14 RX ORDER — ROPINIROLE 3 MG/1
3 TABLET, FILM COATED ORAL EVERY 8 HOURS
Qty: 270 TABLET | Refills: 0 | Status: SHIPPED | OUTPATIENT
Start: 2023-11-14 | End: 2024-05-07 | Stop reason: SDUPTHER

## 2023-11-20 DIAGNOSIS — M54.2 CHRONIC NECK PAIN: ICD-10-CM

## 2023-11-20 DIAGNOSIS — G89.29 CHRONIC NECK PAIN: ICD-10-CM

## 2023-11-20 RX ORDER — TIZANIDINE 4 MG/1
4 TABLET ORAL NIGHTLY
Qty: 90 TABLET | Refills: 6 | Status: SHIPPED | OUTPATIENT
Start: 2023-11-20

## 2023-11-28 DIAGNOSIS — E78.5 HYPERLIPIDEMIA LDL GOAL <100: ICD-10-CM

## 2023-11-28 DIAGNOSIS — B37.2 INTERTRIGINOUS CANDIDIASIS: ICD-10-CM

## 2023-11-28 RX ORDER — POTASSIUM CHLORIDE 750 MG/1
10 TABLET, FILM COATED, EXTENDED RELEASE ORAL 3 TIMES DAILY
Qty: 270 TABLET | Refills: 0 | Status: SHIPPED | OUTPATIENT
Start: 2023-11-28 | End: 2024-02-26

## 2023-12-20 DIAGNOSIS — R10.9 ABDOMINAL PAIN, UNSPECIFIED ABDOMINAL LOCATION: Primary | ICD-10-CM

## 2023-12-20 RX ORDER — DICYCLOMINE HYDROCHLORIDE 10 MG/1
10 CAPSULE ORAL 4 TIMES DAILY
Qty: 120 CAPSULE | Refills: 0 | Status: SHIPPED | OUTPATIENT
Start: 2023-12-20

## 2024-01-02 ENCOUNTER — APPOINTMENT (OUTPATIENT)
Dept: HEMATOLOGY/ONCOLOGY | Facility: CLINIC | Age: 74
End: 2024-01-02
Payer: MEDICARE

## 2024-01-04 ENCOUNTER — APPOINTMENT (OUTPATIENT)
Dept: HEMATOLOGY/ONCOLOGY | Facility: CLINIC | Age: 74
End: 2024-01-04
Payer: MEDICARE

## 2024-01-11 ENCOUNTER — INFUSION (OUTPATIENT)
Dept: HEMATOLOGY/ONCOLOGY | Facility: CLINIC | Age: 74
End: 2024-01-11
Payer: MEDICARE

## 2024-01-11 ENCOUNTER — TELEPHONE (OUTPATIENT)
Dept: HEMATOLOGY/ONCOLOGY | Facility: HOSPITAL | Age: 74
End: 2024-01-11

## 2024-01-11 VITALS
HEART RATE: 73 BPM | DIASTOLIC BLOOD PRESSURE: 89 MMHG | RESPIRATION RATE: 17 BRPM | OXYGEN SATURATION: 99 % | WEIGHT: 284.5 LBS | TEMPERATURE: 97.3 F | SYSTOLIC BLOOD PRESSURE: 149 MMHG | BODY MASS INDEX: 53.76 KG/M2

## 2024-01-11 DIAGNOSIS — G43.711 CHRONIC MIGRAINE WITHOUT AURA, WITH INTRACTABLE MIGRAINE, SO STATED, WITH STATUS MIGRAINOSUS: ICD-10-CM

## 2024-01-11 DIAGNOSIS — G43.711 CHRONIC MIGRAINE WITHOUT AURA, WITH INTRACTABLE MIGRAINE, SO STATED, WITH STATUS MIGRAINOSUS: Primary | ICD-10-CM

## 2024-01-11 PROCEDURE — 96365 THER/PROPH/DIAG IV INF INIT: CPT | Mod: INF

## 2024-01-11 PROCEDURE — 2500000004 HC RX 250 GENERAL PHARMACY W/ HCPCS (ALT 636 FOR OP/ED): Mod: SE | Performed by: PSYCHIATRY & NEUROLOGY

## 2024-01-11 RX ORDER — HEPARIN SODIUM,PORCINE/PF 10 UNIT/ML
50 SYRINGE (ML) INTRAVENOUS AS NEEDED
Status: CANCELLED | OUTPATIENT
Start: 2024-01-11

## 2024-01-11 RX ORDER — EPINEPHRINE 0.3 MG/.3ML
0.3 INJECTION SUBCUTANEOUS EVERY 5 MIN PRN
Status: CANCELLED | OUTPATIENT
Start: 2024-04-10

## 2024-01-11 RX ORDER — ALBUTEROL SULFATE 0.83 MG/ML
3 SOLUTION RESPIRATORY (INHALATION) AS NEEDED
Status: CANCELLED | OUTPATIENT
Start: 2024-04-10

## 2024-01-11 RX ORDER — FAMOTIDINE 10 MG/ML
20 INJECTION INTRAVENOUS ONCE AS NEEDED
Status: CANCELLED | OUTPATIENT
Start: 2024-04-10

## 2024-01-11 RX ORDER — DIPHENHYDRAMINE HYDROCHLORIDE 50 MG/ML
50 INJECTION INTRAMUSCULAR; INTRAVENOUS AS NEEDED
Status: CANCELLED | OUTPATIENT
Start: 2024-04-10

## 2024-01-11 RX ORDER — HEPARIN 100 UNIT/ML
500 SYRINGE INTRAVENOUS AS NEEDED
Status: CANCELLED | OUTPATIENT
Start: 2024-01-11

## 2024-01-11 RX ADMIN — EPTINEZUMAB-JJMR 300 MG: 100 INJECTION INTRAVENOUS at 14:04

## 2024-01-11 ASSESSMENT — PAIN SCALES - GENERAL: PAINLEVEL: 7

## 2024-01-26 ENCOUNTER — APPOINTMENT (OUTPATIENT)
Dept: PSYCHOLOGY | Facility: CLINIC | Age: 74
End: 2024-01-26
Payer: MEDICARE

## 2024-01-29 DIAGNOSIS — G43.711 CHRONIC MIGRAINE WITHOUT AURA, INTRACTABLE, WITH STATUS MIGRAINOSUS: Primary | ICD-10-CM

## 2024-01-29 DIAGNOSIS — R41.89 COGNITIVE CHANGE: ICD-10-CM

## 2024-01-31 DIAGNOSIS — F41.1 GENERALIZED ANXIETY DISORDER: Primary | ICD-10-CM

## 2024-02-01 DIAGNOSIS — N32.81 OVERACTIVE BLADDER: Primary | ICD-10-CM

## 2024-02-01 RX ORDER — OXYBUTYNIN CHLORIDE 5 MG/1
5 TABLET ORAL 3 TIMES DAILY
Qty: 270 TABLET | Refills: 0 | Status: SHIPPED | OUTPATIENT
Start: 2024-02-01

## 2024-02-01 RX ORDER — SERTRALINE HYDROCHLORIDE 25 MG/1
25 TABLET, FILM COATED ORAL NIGHTLY
Qty: 90 TABLET | Refills: 3 | Status: SHIPPED | OUTPATIENT
Start: 2024-02-01 | End: 2024-03-19 | Stop reason: SINTOL

## 2024-02-06 DIAGNOSIS — J30.9 ALLERGIC RHINITIS, UNSPECIFIED SEASONALITY, UNSPECIFIED TRIGGER: Primary | ICD-10-CM

## 2024-02-06 RX ORDER — CETIRIZINE HYDROCHLORIDE 10 MG/1
10 TABLET ORAL DAILY
Qty: 90 TABLET | Refills: 3 | Status: SHIPPED | OUTPATIENT
Start: 2024-02-06

## 2024-02-19 ENCOUNTER — TELEPHONE (OUTPATIENT)
Dept: PRIMARY CARE | Facility: CLINIC | Age: 74
End: 2024-02-19
Payer: MEDICARE

## 2024-02-19 DIAGNOSIS — E03.9 ACQUIRED HYPOTHYROIDISM: Primary | ICD-10-CM

## 2024-02-19 RX ORDER — LEVOTHYROXINE SODIUM 125 UG/1
125 TABLET ORAL DAILY
Qty: 90 TABLET | Refills: 0 | Status: SHIPPED | OUTPATIENT
Start: 2024-02-19 | End: 2024-05-16 | Stop reason: SDUPTHER

## 2024-02-21 DIAGNOSIS — G43.711 CHRONIC MIGRAINE WITHOUT AURA, WITH INTRACTABLE MIGRAINE, SO STATED, WITH STATUS MIGRAINOSUS: ICD-10-CM

## 2024-02-21 RX ORDER — SUMATRIPTAN SUCCINATE 100 MG/1
TABLET ORAL
COMMUNITY
Start: 2018-03-19

## 2024-02-21 RX ORDER — ROSUVASTATIN CALCIUM 20 MG/1
20 TABLET, COATED ORAL NIGHTLY
COMMUNITY
Start: 2023-11-29

## 2024-02-21 RX ORDER — RIZATRIPTAN BENZOATE 10 MG/1
TABLET, ORALLY DISINTEGRATING ORAL
COMMUNITY
Start: 2020-04-03

## 2024-02-21 RX ORDER — LAMOTRIGINE 25(42)-100
KIT ORAL
COMMUNITY
Start: 2023-07-28

## 2024-02-22 RX ORDER — ACETAZOLAMIDE 250 MG/1
250 TABLET ORAL NIGHTLY
Qty: 90 TABLET | Refills: 0 | Status: SHIPPED | OUTPATIENT
Start: 2024-02-22 | End: 2024-05-20 | Stop reason: SDUPTHER

## 2024-03-13 ENCOUNTER — TELEPHONE (OUTPATIENT)
Dept: PRIMARY CARE | Facility: CLINIC | Age: 74
End: 2024-03-13
Payer: MEDICARE

## 2024-03-13 NOTE — TELEPHONE ENCOUNTER
Pt has called several times and wants to speak to a provider, not have a reply to her on MyChart. States her depression is getting worse although she is already taking medication for it.

## 2024-03-13 NOTE — TELEPHONE ENCOUNTER
Called pt back, she asked for a couple of dates in the future. Will call back after checking with daughters availability

## 2024-03-14 DIAGNOSIS — G43.711 CHRONIC MIGRAINE WITHOUT AURA, WITH INTRACTABLE MIGRAINE, SO STATED, WITH STATUS MIGRAINOSUS: ICD-10-CM

## 2024-03-15 RX ORDER — KETOROLAC TROMETHAMINE 15.75 MG/1
SPRAY, METERED NASAL
Qty: 5 EACH | Refills: 5 | Status: SHIPPED | OUTPATIENT
Start: 2024-03-15 | End: 2024-05-01 | Stop reason: SDUPTHER

## 2024-03-18 ENCOUNTER — SPECIALTY PHARMACY (OUTPATIENT)
Dept: PHARMACY | Facility: CLINIC | Age: 74
End: 2024-03-18

## 2024-03-18 PROCEDURE — RXMED WILLOW AMBULATORY MEDICATION CHARGE

## 2024-03-19 ENCOUNTER — PHARMACY VISIT (OUTPATIENT)
Dept: PHARMACY | Facility: CLINIC | Age: 74
End: 2024-03-19
Payer: COMMERCIAL

## 2024-03-19 ENCOUNTER — OFFICE VISIT (OUTPATIENT)
Dept: PRIMARY CARE | Facility: CLINIC | Age: 74
End: 2024-03-19
Payer: MEDICARE

## 2024-03-19 VITALS
BODY MASS INDEX: 54.37 KG/M2 | WEIGHT: 288 LBS | DIASTOLIC BLOOD PRESSURE: 80 MMHG | HEIGHT: 61 IN | OXYGEN SATURATION: 100 % | HEART RATE: 63 BPM | SYSTOLIC BLOOD PRESSURE: 142 MMHG

## 2024-03-19 DIAGNOSIS — F11.20 CONTINUOUS OPIOID DEPENDENCE (MULTI): ICD-10-CM

## 2024-03-19 DIAGNOSIS — G95.9 DISEASE OF SPINAL CORD, UNSPECIFIED (MULTI): ICD-10-CM

## 2024-03-19 DIAGNOSIS — E66.3 OVERWEIGHT: ICD-10-CM

## 2024-03-19 DIAGNOSIS — F33.2 SEVERE EPISODE OF RECURRENT MAJOR DEPRESSIVE DISORDER, WITHOUT PSYCHOTIC FEATURES (MULTI): Primary | ICD-10-CM

## 2024-03-19 DIAGNOSIS — I69.354 HEMIPLEGIA AND HEMIPARESIS FOLLOWING CEREBRAL INFARCTION AFFECTING LEFT NON-DOMINANT SIDE (MULTI): ICD-10-CM

## 2024-03-19 DIAGNOSIS — E03.9 ACQUIRED HYPOTHYROIDISM: ICD-10-CM

## 2024-03-19 DIAGNOSIS — G40.909 SEIZURE DISORDER (MULTI): ICD-10-CM

## 2024-03-19 PROBLEM — L50.3 DERMATOGRAPHIC URTICARIA: Status: RESOLVED | Noted: 2022-03-09 | Resolved: 2024-03-19

## 2024-03-19 PROBLEM — R59.0 CERVICAL LYMPHADENOPATHY: Status: RESOLVED | Noted: 2023-09-10 | Resolved: 2024-03-19

## 2024-03-19 PROBLEM — R41.82 ALTERED MENTAL STATUS: Status: RESOLVED | Noted: 2023-09-10 | Resolved: 2024-03-19

## 2024-03-19 PROBLEM — R05.8 DRY COUGH: Status: RESOLVED | Noted: 2023-09-10 | Resolved: 2024-03-19

## 2024-03-19 PROBLEM — D23.9 OTHER BENIGN NEOPLASM OF SKIN, UNSPECIFIED: Status: RESOLVED | Noted: 2022-03-09 | Resolved: 2024-03-19

## 2024-03-19 PROBLEM — R21 RASH AND OTHER NONSPECIFIC SKIN ERUPTION: Status: RESOLVED | Noted: 2022-03-09 | Resolved: 2024-03-19

## 2024-03-19 PROCEDURE — 1036F TOBACCO NON-USER: CPT | Performed by: NURSE PRACTITIONER

## 2024-03-19 PROCEDURE — 1160F RVW MEDS BY RX/DR IN RCRD: CPT | Performed by: NURSE PRACTITIONER

## 2024-03-19 PROCEDURE — 99213 OFFICE O/P EST LOW 20 MIN: CPT | Performed by: NURSE PRACTITIONER

## 2024-03-19 PROCEDURE — 1157F ADVNC CARE PLAN IN RCRD: CPT | Performed by: NURSE PRACTITIONER

## 2024-03-19 PROCEDURE — 1159F MED LIST DOCD IN RCRD: CPT | Performed by: NURSE PRACTITIONER

## 2024-03-19 PROCEDURE — 3008F BODY MASS INDEX DOCD: CPT | Performed by: NURSE PRACTITIONER

## 2024-03-19 RX ORDER — BUPROPION HYDROCHLORIDE 150 MG/1
150 TABLET ORAL EVERY MORNING
Qty: 90 TABLET | Refills: 0 | Status: SHIPPED | OUTPATIENT
Start: 2024-03-19 | End: 2024-06-17

## 2024-03-19 ASSESSMENT — PATIENT HEALTH QUESTIONNAIRE - PHQ9
1. LITTLE INTEREST OR PLEASURE IN DOING THINGS: NEARLY EVERY DAY
8. MOVING OR SPEAKING SO SLOWLY THAT OTHER PEOPLE COULD HAVE NOTICED. OR THE OPPOSITE, BEING SO FIGETY OR RESTLESS THAT YOU HAVE BEEN MOVING AROUND A LOT MORE THAN USUAL: NOT AT ALL
SUM OF ALL RESPONSES TO PHQ9 QUESTIONS 1 & 2: 6
10. IF YOU CHECKED OFF ANY PROBLEMS, HOW DIFFICULT HAVE THESE PROBLEMS MADE IT FOR YOU TO DO YOUR WORK, TAKE CARE OF THINGS AT HOME, OR GET ALONG WITH OTHER PEOPLE: SOMEWHAT DIFFICULT
7. TROUBLE CONCENTRATING ON THINGS, SUCH AS READING THE NEWSPAPER OR WATCHING TELEVISION: NEARLY EVERY DAY
3. TROUBLE FALLING OR STAYING ASLEEP: NEARLY EVERY DAY
6. FEELING BAD ABOUT YOURSELF - OR THAT YOU ARE A FAILURE OR HAVE LET YOURSELF OR YOUR FAMILY DOWN: MORE THAN HALF THE DAYS
9. THOUGHTS THAT YOU WOULD BE BETTER OFF DEAD, OR OF HURTING YOURSELF: NOT AT ALL
5. POOR APPETITE OR OVEREATING: NEARLY EVERY DAY
SUM OF ALL RESPONSES TO PHQ QUESTIONS 1-9: 20
2. FEELING DOWN, DEPRESSED OR HOPELESS: NEARLY EVERY DAY
4. FEELING TIRED OR HAVING LITTLE ENERGY: NEARLY EVERY DAY

## 2024-03-19 NOTE — PROGRESS NOTES
"Subjective   Chief Complaint   Patient presents with    Depression     Having a lot of things going on and not able to handle the stress        Patient ID: Khadra Earl is a 73 y.o. female who presents for Depression (Having a lot of things going on and not able to handle the stress ).    HPI  Khadra is a 72 yo est female pt presenting today for 4 month f/u and concerns about her anxiety/depression    Dx: ANXIETY, DEPRESSION, LYMPHEDEMA, HYPOTHYROIDISM, RLS, MIGRAINES, SEIZURES, MORBID OBESITY      Pt reports that she feels her depression is worse the past 5 months as her spouse has been really sick and hospitalized.  She states she has been really stressed out as he is not able to work and financially it has been really hard for them    Patient states to be in usual state of health without any recent hospitalizations, falls or infections     Pt has not been going to therapy for neck pain d/t spending time with her sick      Pt has appt with neuropsych, Dr. Del Toro for eval of Alzheimers d/t family history  Pt states \"I feel more forgetful and will start doing something, then I forget what I was doing\"  Onset: 2-3 months ago   Pt had CT head June 2023---> normal      #ANXIETY/DEPRESSION  Rx Amitriptyline at bedtime   PHQ= 20/27  Denies SI/HI   Will Rx Wellbutrin      #SEIZURES  Neuro: Dr. York   Rx Divalproex 750 mg daily  Last sz: 2 weeks ago      #LYMPHEDEMA  Saw Dr. Mohseni  Does compression treatments at home 2 times/day      #HYPOTHYROIDISM  TSH is due   Rx Levothyroxine 125 mcg x 6 days/week      #MIGRAINES  Neuro: Dr. York; had injection last month   Rx Ubrelvy, Tizanidine, Sprix nasal     Review of Systems  All 13 systems were reviewed and are within normal limits except positive and pertinent negative responses which are noted below or in HPI.      Objective   /80   Pulse 63   Ht 1.549 m (5' 1\")   Wt 131 kg (288 lb)   SpO2 100%   BMI 54.42 kg/m²      Current Outpatient Medications "   Medication Instructions    acetaZOLAMIDE (DIAMOX) 250 mg, oral, Nightly    amitriptyline (ELAVIL) 50 mg, oral, Nightly    baclofen (LIORESAL) 10 mg, oral, As needed, With food or milk 1-5x daily    benzonatate (TESSALON) 100 mg, oral, As needed    buPROPion XL (WELLBUTRIN XL) 150 mg, oral, Every morning, Do not crush, chew, or split.    cetirizine (ZYRTEC) 10 mg, oral, Daily    cholecalciferol, vitamin D3, (VITAMIN D3 ORAL) 1 capsule, oral, Daily, Vitamin D-3 1000 UNIT    cyanocobalamin (Vitamin B-12) 1,000 mcg tablet 1 tablet, oral, Daily    dicyclomine (BENTYL) 10 mg, oral, 4 times daily    eptinezumab (VYEPTI) 300 mg, intravenous, Every 3 months    eptinezumab (VYEPTI) 300 mg, intravenous, Every 3 months    fluocinonide 0.05 % cream 1 Application, Topical    furosemide (LASIX) 20 mg, oral, Daily PRN    ketoconazole (NIZOral) 2 % cream 1 Application, Topical, Daily, To affected area externally    ketorolac (Sprix) nasal 1 SPRAY IN EACH NOSTRIL EVERY 6 TO 8 HOURS. MAXIMUM DOSE 8 SPRAYS IN 24 HOUR PERIOD    ketorolac (Toradol) 10 mg tablet TAKE 1 TABLET BY MOUTH EVERY 6 HOURS WITH FOOD    lamoTRIgine 25 mg (42) -100 mg (7) tablets,dose pack oral    levothyroxine (SYNTHROID, LEVOXYL) 125 mcg, oral, Daily    lovastatin (MEVACOR) 40 mg, oral, Daily, With a meal    meclizine (ANTIVERT) 25 mg, oral, Every 8 hours PRN    multivitamin (Multiple Vitamins) tablet oral    mupirocin (Bactroban) 2 % ointment APPLY SMALL AMOUNT TOPICALLY TO THE AFFECTED AREA THREE TIMES DAILY AS DIRECTED    oxybutynin (DITROPAN) 5 mg, oral, 3 times daily    rizatriptan MLT (Maxalt-MLT) 10 mg disintegrating tablet oral    rOPINIRole (REQUIP) 3 mg, oral, Every 8 hours    rosuvastatin (CRESTOR) 20 mg, oral, Nightly    SUMAtriptan (Imitrex) 100 mg tablet oral    tiZANidine (ZANAFLEX) 4 mg, oral, Nightly, Increase by 1/2 tablet every 3 days. As needed up to maximum of 3 daily    traMADol (Ultram) 50 mg tablet TAKE 1 TO 2 TABLETS BY MOUTH EVERY 6  HOURS AS NEEDED FOR BREAKTHOUGH PAIN.    triamcinolone (Kenalog) 0.1 % cream Topical, 3 times daily PRN    ubrogepant (Ubrelvy) 100 mg tablet tablet TAKE ONE (1) TABLET BY MOUTH AT ONSET OF MIGRAINE. MAY REPEAT DOSE IN 2 HOURS IF NEEDED. MAXIMUM  MG (2 TABLETS) IN A 24 HOUR PERIOD.         Physical Exam  Vitals reviewed.   Pulmonary:      Effort: Pulmonary effort is normal.   Skin:     General: Skin is warm and dry.   Neurological:      Mental Status: She is alert.   Psychiatric:         Mood and Affect: Mood is depressed. Affect is tearful.         Assessment/Plan   Problem List Items Addressed This Visit             ICD-10-CM    Disease of spinal cord, unspecified (CMS/[facility]) G95.9     Tx per Dr. STANLEY Simpson  Not currently in therapy  Condition stable based on symptoms and exam.    Continue current medications and follow-up at least yearly.           Continuous opioid dependence (CMS/[facility]) F11.20     Rx Tramadol for chronic neck pain   Tx per STANLEY Simpson   Not currently going to therapy          Depression - Primary F32.A    Relevant Medications    buPROPion XL (Wellbutrin XL) 150 mg 24 hr tablet    Other Relevant Orders    [facility]    Hypothyroidism E03.9    Relevant Orders    TSH with reflex to Free T4 if abnormal    Seizure disorder (CMS/[facility]) G40.909     Neuro: Dr. York   Rx Divalproex 750 mg daily  Last sz: 2 weeks ago          Hemiplegia and hemiparesis following cerebral infarction affecting left non-dominant side (CMS/[facility]) I69.354     LEFT side mild weakness noted  Ambulates without cane   No recent falls  Condition stable based on symptoms and exam.    Follow-up at least yearly.           Body mass index (BMI) 50.0-59.9, adult (CMS/[facility]) Z68.43    Relevant Orders    Lipid Panel    Comprehensive Metabolic Panel     Other Visit Diagnoses         Codes    Overweight     E66.3    Relevant Orders    Lipid Panel

## 2024-03-19 NOTE — ASSESSMENT & PLAN NOTE
Tx per Dr. STANLEY Simpson  Not currently in therapy  Condition stable based on symptoms and exam.    Continue current medications and follow-up at least yearly.

## 2024-03-19 NOTE — ASSESSMENT & PLAN NOTE
LEFT side mild weakness noted  Ambulates without cane   No recent falls  Condition stable based on symptoms and exam.    Follow-up at least yearly.

## 2024-03-19 NOTE — PATIENT INSTRUCTIONS
Thank you for seeing me today.  It was a pleasure to see you again!    I'm so sorry to hear about your     #DEPRESSION  PHQ= 20/27  Begin Wellbutrin 150 mg daily  Continue with Amitriptyline as prescribed  [facility]erral to [facility]     Please get blood drawn before next appointment     RTC 4 WEEKS TO ASSESS DEPRESSION

## 2024-03-26 ENCOUNTER — TELEPHONE (OUTPATIENT)
Dept: PRIMARY CARE | Facility: CLINIC | Age: 74
End: 2024-03-26
Payer: MEDICARE

## 2024-03-26 NOTE — TELEPHONE ENCOUNTER
----- Message from JAVIER Armijo sent at 3/19/2024 12:52 PM EDT -----  Regarding: Labs ordered  Please call pt to get labs done before her f/u appt.    Orders are in and she does need to fast for 8 hours.    Thanks  Sheyla

## 2024-04-10 ENCOUNTER — APPOINTMENT (OUTPATIENT)
Dept: HEMATOLOGY/ONCOLOGY | Facility: CLINIC | Age: 74
End: 2024-04-10
Payer: MEDICARE

## 2024-04-12 ENCOUNTER — INFUSION (OUTPATIENT)
Dept: HEMATOLOGY/ONCOLOGY | Facility: CLINIC | Age: 74
End: 2024-04-12
Payer: MEDICARE

## 2024-04-12 ENCOUNTER — SPECIALTY PHARMACY (OUTPATIENT)
Dept: PHARMACY | Facility: CLINIC | Age: 74
End: 2024-04-12

## 2024-04-12 VITALS
HEART RATE: 86 BPM | BODY MASS INDEX: 53.19 KG/M2 | RESPIRATION RATE: 16 BRPM | OXYGEN SATURATION: 98 % | WEIGHT: 281.53 LBS | TEMPERATURE: 97.5 F | DIASTOLIC BLOOD PRESSURE: 79 MMHG | SYSTOLIC BLOOD PRESSURE: 130 MMHG

## 2024-04-12 DIAGNOSIS — E03.9 ACQUIRED HYPOTHYROIDISM: ICD-10-CM

## 2024-04-12 DIAGNOSIS — E66.3 OVERWEIGHT: ICD-10-CM

## 2024-04-12 DIAGNOSIS — G43.119 INTRACTABLE MIGRAINE WITH AURA WITHOUT STATUS MIGRAINOSUS: ICD-10-CM

## 2024-04-12 DIAGNOSIS — G43.711 CHRONIC MIGRAINE WITHOUT AURA, WITH INTRACTABLE MIGRAINE, SO STATED, WITH STATUS MIGRAINOSUS: ICD-10-CM

## 2024-04-12 LAB
ALBUMIN SERPL BCP-MCNC: 3.8 G/DL (ref 3.4–5)
ALP SERPL-CCNC: 145 U/L (ref 33–136)
ALT SERPL W P-5'-P-CCNC: 13 U/L (ref 7–45)
ANION GAP SERPL CALC-SCNC: 13 MMOL/L (ref 10–20)
AST SERPL W P-5'-P-CCNC: 14 U/L (ref 9–39)
BILIRUB SERPL-MCNC: 0.3 MG/DL (ref 0–1.2)
BUN SERPL-MCNC: 21 MG/DL (ref 6–23)
CALCIUM SERPL-MCNC: 8.2 MG/DL (ref 8.6–10.3)
CHLORIDE SERPL-SCNC: 107 MMOL/L (ref 98–107)
CHOLEST SERPL-MCNC: 166 MG/DL (ref 0–199)
CHOLESTEROL/HDL RATIO: 3.5
CO2 SERPL-SCNC: 25 MMOL/L (ref 21–32)
CREAT SERPL-MCNC: 1.06 MG/DL (ref 0.5–1.05)
EGFRCR SERPLBLD CKD-EPI 2021: 55 ML/MIN/1.73M*2
GLUCOSE SERPL-MCNC: 94 MG/DL (ref 74–99)
HDLC SERPL-MCNC: 47.3 MG/DL
LDLC SERPL CALC-MCNC: 97 MG/DL
NON HDL CHOLESTEROL: 119 MG/DL (ref 0–149)
POTASSIUM SERPL-SCNC: 3.7 MMOL/L (ref 3.5–5.3)
PROT SERPL-MCNC: 6.2 G/DL (ref 6.4–8.2)
SODIUM SERPL-SCNC: 141 MMOL/L (ref 136–145)
T4 FREE SERPL-MCNC: 0.65 NG/DL (ref 0.61–1.12)
TRIGL SERPL-MCNC: 107 MG/DL (ref 0–149)
TSH SERPL-ACNC: 4.55 MIU/L (ref 0.44–3.98)
VLDL: 21 MG/DL (ref 0–40)

## 2024-04-12 PROCEDURE — 83718 ASSAY OF LIPOPROTEIN: CPT | Performed by: NURSE PRACTITIONER

## 2024-04-12 PROCEDURE — 80053 COMPREHEN METABOLIC PANEL: CPT | Performed by: NURSE PRACTITIONER

## 2024-04-12 PROCEDURE — 84439 ASSAY OF FREE THYROXINE: CPT | Performed by: NURSE PRACTITIONER

## 2024-04-12 PROCEDURE — 84443 ASSAY THYROID STIM HORMONE: CPT | Performed by: NURSE PRACTITIONER

## 2024-04-12 PROCEDURE — 96365 THER/PROPH/DIAG IV INF INIT: CPT | Mod: INF

## 2024-04-12 PROCEDURE — 96374 THER/PROPH/DIAG INJ IV PUSH: CPT | Mod: 59,INF

## 2024-04-12 PROCEDURE — 2500000004 HC RX 250 GENERAL PHARMACY W/ HCPCS (ALT 636 FOR OP/ED): Mod: SE | Performed by: PSYCHIATRY & NEUROLOGY

## 2024-04-12 RX ORDER — DIPHENHYDRAMINE HYDROCHLORIDE 50 MG/ML
50 INJECTION INTRAMUSCULAR; INTRAVENOUS AS NEEDED
OUTPATIENT
Start: 2024-07-09

## 2024-04-12 RX ORDER — HEPARIN 100 UNIT/ML
500 SYRINGE INTRAVENOUS AS NEEDED
OUTPATIENT
Start: 2024-04-12

## 2024-04-12 RX ORDER — EPINEPHRINE 0.3 MG/.3ML
0.3 INJECTION SUBCUTANEOUS EVERY 5 MIN PRN
OUTPATIENT
Start: 2024-07-09

## 2024-04-12 RX ORDER — HEPARIN SODIUM,PORCINE/PF 10 UNIT/ML
50 SYRINGE (ML) INTRAVENOUS AS NEEDED
OUTPATIENT
Start: 2024-04-12

## 2024-04-12 RX ORDER — ALBUTEROL SULFATE 0.83 MG/ML
3 SOLUTION RESPIRATORY (INHALATION) AS NEEDED
OUTPATIENT
Start: 2024-07-09

## 2024-04-12 RX ORDER — FAMOTIDINE 10 MG/ML
20 INJECTION INTRAVENOUS ONCE AS NEEDED
OUTPATIENT
Start: 2024-07-09

## 2024-04-12 RX ADMIN — EPTINEZUMAB-JJMR 300 MG: 100 INJECTION INTRAVENOUS at 14:41

## 2024-04-12 ASSESSMENT — PAIN SCALES - GENERAL: PAINLEVEL: 4

## 2024-04-16 NOTE — RESULT ENCOUNTER NOTE
Khadra Earl    I have reviewed all of your blood work and I have a few recommendations:  1. Increase your intake of protein daily  2. Increase your daily intake of Calcium or begin a daily supplement  3. Increase your daily intake of water    If you have any questions, please feel free to call my office.    Take Care,   Sheyla

## 2024-04-30 ENCOUNTER — TELEPHONE (OUTPATIENT)
Dept: NEUROLOGY | Facility: CLINIC | Age: 74
End: 2024-04-30
Payer: MEDICARE

## 2024-04-30 DIAGNOSIS — G43.711 CHRONIC MIGRAINE WITHOUT AURA, WITH INTRACTABLE MIGRAINE, SO STATED, WITH STATUS MIGRAINOSUS: ICD-10-CM

## 2024-04-30 NOTE — TELEPHONE ENCOUNTER
Called to refill ketorolac (Sprix) nasal.  Pharmacy is   [facility] - [place], [place] - [address] [place] [address]. Phone: [phone]

## 2024-05-01 RX ORDER — KETOROLAC TROMETHAMINE 15.75 MG/1
SPRAY, METERED NASAL
Qty: 1 EACH | Refills: 5 | Status: SHIPPED | OUTPATIENT
Start: 2024-05-01

## 2024-05-03 ENCOUNTER — TELEPHONE (OUTPATIENT)
Dept: PRIMARY CARE | Facility: CLINIC | Age: 74
End: 2024-05-03
Payer: MEDICARE

## 2024-05-03 NOTE — TELEPHONE ENCOUNTER
Pharmacy sent us a refill request for:     Potassium Cl 10 meq er tablets   Qty 270    Sig: take 1 tablet by mouth 3 times per day       I do not see this in her file

## 2024-05-07 DIAGNOSIS — G25.81 RESTLESS LEGS SYNDROME: ICD-10-CM

## 2024-05-07 RX ORDER — ROPINIROLE 3 MG/1
3 TABLET, FILM COATED ORAL EVERY 8 HOURS
Qty: 270 TABLET | Refills: 0 | Status: SHIPPED | OUTPATIENT
Start: 2024-05-07 | End: 2024-08-05

## 2024-05-13 ENCOUNTER — SPECIALTY PHARMACY (OUTPATIENT)
Dept: PHARMACY | Facility: CLINIC | Age: 74
End: 2024-05-13

## 2024-05-16 DIAGNOSIS — E03.9 ACQUIRED HYPOTHYROIDISM: ICD-10-CM

## 2024-05-16 RX ORDER — LEVOTHYROXINE SODIUM 125 UG/1
125 TABLET ORAL DAILY
Qty: 90 TABLET | Refills: 0 | Status: SHIPPED | OUTPATIENT
Start: 2024-05-16

## 2024-05-17 ENCOUNTER — APPOINTMENT (OUTPATIENT)
Dept: HEMATOLOGY/ONCOLOGY | Facility: CLINIC | Age: 74
End: 2024-05-17
Payer: MEDICARE

## 2024-05-20 DIAGNOSIS — G43.711 CHRONIC MIGRAINE WITHOUT AURA, WITH INTRACTABLE MIGRAINE, SO STATED, WITH STATUS MIGRAINOSUS: ICD-10-CM

## 2024-05-20 DIAGNOSIS — G43.711 INTRACTABLE CHRONIC MIGRAINE WITHOUT AURA AND WITH STATUS MIGRAINOSUS: ICD-10-CM

## 2024-05-21 RX ORDER — AMITRIPTYLINE HYDROCHLORIDE 25 MG/1
50 TABLET, FILM COATED ORAL NIGHTLY
Qty: 180 TABLET | Refills: 0 | Status: SHIPPED | OUTPATIENT
Start: 2024-05-21

## 2024-05-21 RX ORDER — ACETAZOLAMIDE 250 MG/1
250 TABLET ORAL NIGHTLY
Qty: 90 TABLET | Refills: 0 | Status: SHIPPED | OUTPATIENT
Start: 2024-05-21

## 2024-05-29 ENCOUNTER — TELEPHONE (OUTPATIENT)
Dept: PRIMARY CARE | Facility: CLINIC | Age: 74
End: 2024-05-29
Payer: MEDICARE

## 2024-05-29 DIAGNOSIS — E87.6 HYPOKALEMIA: Primary | ICD-10-CM

## 2024-05-29 RX ORDER — POTASSIUM CHLORIDE 750 MG/1
10 TABLET, FILM COATED, EXTENDED RELEASE ORAL 3 TIMES DAILY
Qty: 90 TABLET | Refills: 5 | Status: SHIPPED | OUTPATIENT
Start: 2024-05-29

## 2024-05-29 NOTE — TELEPHONE ENCOUNTER
Patient requesting refill of medication below I do not see it in her current medications    potassium cl 10 meq er tablets  Take 1 tablet by mouth tid   Qty 270

## 2024-06-05 ENCOUNTER — PHARMACY VISIT (OUTPATIENT)
Dept: PHARMACY | Facility: CLINIC | Age: 74
End: 2024-06-05
Payer: COMMERCIAL

## 2024-06-05 ENCOUNTER — SPECIALTY PHARMACY (OUTPATIENT)
Dept: PHARMACY | Facility: CLINIC | Age: 74
End: 2024-06-05

## 2024-06-05 PROCEDURE — RXMED WILLOW AMBULATORY MEDICATION CHARGE

## 2024-06-13 DIAGNOSIS — F33.2 SEVERE EPISODE OF RECURRENT MAJOR DEPRESSIVE DISORDER, WITHOUT PSYCHOTIC FEATURES (MULTI): ICD-10-CM

## 2024-06-13 RX ORDER — BUPROPION HYDROCHLORIDE 150 MG/1
150 TABLET ORAL EVERY MORNING
Qty: 90 TABLET | Refills: 3 | Status: SHIPPED | OUTPATIENT
Start: 2024-06-13 | End: 2025-06-08

## 2024-06-17 ENCOUNTER — OFFICE VISIT (OUTPATIENT)
Dept: PSYCHOLOGY | Facility: HOSPITAL | Age: 74
End: 2024-06-17
Payer: MEDICARE

## 2024-06-17 DIAGNOSIS — R41.3 MEMORY DIFFICULTY: ICD-10-CM

## 2024-06-17 PROCEDURE — 3008F BODY MASS INDEX DOCD: CPT | Performed by: CLINICAL NEUROPSYCHOLOGIST

## 2024-06-17 PROCEDURE — 99211NT NEUROPYSCH TESTING PENDING FINAL BILLING: Performed by: CLINICAL NEUROPSYCHOLOGIST

## 2024-06-17 PROCEDURE — 1157F ADVNC CARE PLAN IN RCRD: CPT | Performed by: CLINICAL NEUROPSYCHOLOGIST

## 2024-06-17 NOTE — PROGRESS NOTES
Ms. Earl was seen for an initial visit as part of a multi-visit neuropsychological evaluation and is scheduled to return to clinic to complete the evaluation on 7/8/24.  Please see note on final visit for complete history, results, impressions, and recommendations.    Thank you for the opportunity to participate in Ms. Earl's evaluation and care.  If I can provide any additional assistance, please do not hesitate to contact me at [phone].    Sincerely,        Bao Schmidt, PhD  Clinical Neuropsychologist  Former Director of Clinical Neuropsychology, [facility]  Professor of Neurology, [employer]  Fellow of the National Academy of Neuropsychology  Fellow of the American Psychological Association  Fellow of the Sports Neuropsychology Society  Fellow of the American Epilepsy Society

## 2024-06-28 ENCOUNTER — OFFICE VISIT (OUTPATIENT)
Dept: NEUROLOGY | Facility: HOSPITAL | Age: 74
End: 2024-06-28
Payer: COMMERCIAL

## 2024-06-28 VITALS
HEIGHT: 61 IN | WEIGHT: 279 LBS | DIASTOLIC BLOOD PRESSURE: 83 MMHG | RESPIRATION RATE: 18 BRPM | BODY MASS INDEX: 52.67 KG/M2 | HEART RATE: 83 BPM | TEMPERATURE: 96 F | SYSTOLIC BLOOD PRESSURE: 149 MMHG

## 2024-06-28 DIAGNOSIS — R56.9 SEIZURES (MULTI): Primary | ICD-10-CM

## 2024-06-28 DIAGNOSIS — G43.711 CHRONIC MIGRAINE WITHOUT AURA, WITH INTRACTABLE MIGRAINE, SO STATED, WITH STATUS MIGRAINOSUS: ICD-10-CM

## 2024-06-28 DIAGNOSIS — G43.711 INTRACTABLE CHRONIC MIGRAINE WITHOUT AURA AND WITH STATUS MIGRAINOSUS: ICD-10-CM

## 2024-06-28 DIAGNOSIS — R11.2 NAUSEA AND VOMITING, UNSPECIFIED VOMITING TYPE: ICD-10-CM

## 2024-06-28 PROBLEM — R07.9 CHEST PAIN: Status: RESOLVED | Noted: 2023-09-10 | Resolved: 2024-06-28

## 2024-06-28 PROBLEM — F44.5 PSYCHOGENIC NONEPILEPTIC SEIZURE: Status: RESOLVED | Noted: 2023-09-10 | Resolved: 2024-06-28

## 2024-06-28 PROCEDURE — 99215 OFFICE O/P EST HI 40 MIN: CPT | Performed by: PSYCHIATRY & NEUROLOGY

## 2024-06-28 PROCEDURE — 96372 THER/PROPH/DIAG INJ SC/IM: CPT | Performed by: PSYCHIATRY & NEUROLOGY

## 2024-06-28 PROCEDURE — 2500000004 HC RX 250 GENERAL PHARMACY W/ HCPCS (ALT 636 FOR OP/ED): Mod: SE | Performed by: PSYCHIATRY & NEUROLOGY

## 2024-06-28 RX ORDER — PROMETHAZINE HYDROCHLORIDE 25 MG/ML
25 INJECTION, SOLUTION INTRAMUSCULAR; INTRAVENOUS ONCE
Status: COMPLETED | OUTPATIENT
Start: 2024-06-28 | End: 2024-06-28

## 2024-06-28 RX ORDER — PROMETHAZINE HYDROCHLORIDE 25 MG/1
25 TABLET ORAL EVERY 6 HOURS PRN
Qty: 30 TABLET | Refills: 3 | Status: SHIPPED | OUTPATIENT
Start: 2024-06-28

## 2024-06-28 RX ORDER — AMITRIPTYLINE HYDROCHLORIDE 10 MG/1
TABLET, FILM COATED ORAL NIGHTLY
Qty: 140 TABLET | Refills: 0 | Status: SHIPPED | OUTPATIENT
Start: 2024-06-28 | End: 2024-08-23

## 2024-06-28 RX ORDER — PROMETHAZINE HYDROCHLORIDE 25 MG/ML
25 INJECTION, SOLUTION INTRAMUSCULAR; INTRAVENOUS ONCE
Status: CANCELLED | OUTPATIENT
Start: 2024-06-28 | End: 2024-06-28

## 2024-06-28 RX ORDER — LAMOTRIGINE 25(42)-100
KIT ORAL
Qty: 1 KIT | Refills: 3 | Status: SHIPPED | OUTPATIENT
Start: 2024-06-28

## 2024-06-28 RX ORDER — ACETAMINOPHEN 325 MG/1
TABLET ORAL EVERY 8 HOURS
COMMUNITY
Start: 2023-01-17

## 2024-06-28 RX ORDER — KETOROLAC TROMETHAMINE 30 MG/ML
60 INJECTION, SOLUTION INTRAMUSCULAR; INTRAVENOUS ONCE
Status: COMPLETED | OUTPATIENT
Start: 2024-06-28 | End: 2024-06-28

## 2024-06-28 RX ORDER — KETOROLAC TROMETHAMINE 10 MG/1
10 TABLET, FILM COATED ORAL EVERY 6 HOURS
Qty: 20 TABLET | Refills: 0 | Status: SHIPPED | OUTPATIENT
Start: 2024-06-28 | End: 2024-07-03

## 2024-06-28 ASSESSMENT — PATIENT HEALTH QUESTIONNAIRE - PHQ9
2. FEELING DOWN, DEPRESSED OR HOPELESS: NEARLY EVERY DAY
8. MOVING OR SPEAKING SO SLOWLY THAT OTHER PEOPLE COULD HAVE NOTICED. OR THE OPPOSITE, BEING SO FIGETY OR RESTLESS THAT YOU HAVE BEEN MOVING AROUND A LOT MORE THAN USUAL: NOT AT ALL
7. TROUBLE CONCENTRATING ON THINGS, SUCH AS READING THE NEWSPAPER OR WATCHING TELEVISION: NOT AT ALL
1. LITTLE INTEREST OR PLEASURE IN DOING THINGS: NOT AT ALL
5. POOR APPETITE OR OVEREATING: NOT AT ALL
6. FEELING BAD ABOUT YOURSELF - OR THAT YOU ARE A FAILURE OR HAVE LET YOURSELF OR YOUR FAMILY DOWN: NOT AT ALL
SUM OF ALL RESPONSES TO PHQ QUESTIONS 1-9: 6
9. THOUGHTS THAT YOU WOULD BE BETTER OFF DEAD, OR OF HURTING YOURSELF: NOT AT ALL
SUM OF ALL RESPONSES TO PHQ9 QUESTIONS 1 AND 2: 3
10. IF YOU CHECKED OFF ANY PROBLEMS, HOW DIFFICULT HAVE THESE PROBLEMS MADE IT FOR YOU TO DO YOUR WORK, TAKE CARE OF THINGS AT HOME, OR GET ALONG WITH OTHER PEOPLE: SOMEWHAT DIFFICULT
4. FEELING TIRED OR HAVING LITTLE ENERGY: SEVERAL DAYS
3. TROUBLE FALLING OR STAYING ASLEEP OR SLEEPING TOO MUCH: MORE THAN HALF THE DAYS

## 2024-06-28 ASSESSMENT — ENCOUNTER SYMPTOMS
OCCASIONAL FEELINGS OF UNSTEADINESS: 1
DEPRESSION: 1
LOSS OF SENSATION IN FEET: 0

## 2024-06-28 ASSESSMENT — PAIN SCALES - GENERAL: PAINLEVEL: 8
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
## 2024-07-02 DIAGNOSIS — R10.9 ABDOMINAL PAIN, UNSPECIFIED ABDOMINAL LOCATION: ICD-10-CM

## 2024-07-02 RX ORDER — DICYCLOMINE HYDROCHLORIDE 10 MG/1
CAPSULE ORAL
Qty: 120 CAPSULE | Refills: 0 | Status: SHIPPED | OUTPATIENT
Start: 2024-07-02

## 2024-07-08 ENCOUNTER — APPOINTMENT (OUTPATIENT)
Dept: PSYCHOLOGY | Facility: HOSPITAL | Age: 74
End: 2024-07-08
Payer: MEDICARE

## 2024-07-08 DIAGNOSIS — F32.A DEPRESSION, UNSPECIFIED DEPRESSION TYPE: ICD-10-CM

## 2024-07-08 DIAGNOSIS — G43.119 INTRACTABLE MIGRAINE WITH AURA WITHOUT STATUS MIGRAINOSUS: ICD-10-CM

## 2024-07-08 DIAGNOSIS — F41.9 ANXIETY DISORDER, UNSPECIFIED TYPE: ICD-10-CM

## 2024-07-08 DIAGNOSIS — G47.00 INSOMNIA, UNSPECIFIED TYPE: ICD-10-CM

## 2024-07-08 DIAGNOSIS — R41.3 MEMORY DIFFICULTY: Primary | ICD-10-CM

## 2024-07-08 PROCEDURE — 96132 NRPSYC TST EVAL PHYS/QHP 1ST: CPT | Mod: AH | Performed by: CLINICAL NEUROPSYCHOLOGIST

## 2024-07-08 PROCEDURE — 96139 PSYCL/NRPSYC TST TECH EA: CPT | Mod: AH | Performed by: CLINICAL NEUROPSYCHOLOGIST

## 2024-07-08 PROCEDURE — 96116 NUBHVL XM PHYS/QHP 1ST HR: CPT | Mod: AH | Performed by: CLINICAL NEUROPSYCHOLOGIST

## 2024-07-08 PROCEDURE — 96139 PSYCL/NRPSYC TST TECH EA: CPT | Performed by: CLINICAL NEUROPSYCHOLOGIST

## 2024-07-08 PROCEDURE — 96138 PSYCL/NRPSYC TECH 1ST: CPT | Mod: AH | Performed by: CLINICAL NEUROPSYCHOLOGIST

## 2024-07-08 PROCEDURE — 96133 NRPSYC TST EVAL PHYS/QHP EA: CPT | Mod: AH | Performed by: CLINICAL NEUROPSYCHOLOGIST

## 2024-07-08 PROCEDURE — 96133 NRPSYC TST EVAL PHYS/QHP EA: CPT | Performed by: CLINICAL NEUROPSYCHOLOGIST

## 2024-07-08 PROCEDURE — 96132 NRPSYC TST EVAL PHYS/QHP 1ST: CPT | Performed by: CLINICAL NEUROPSYCHOLOGIST

## 2024-07-08 PROCEDURE — 96138 PSYCL/NRPSYC TECH 1ST: CPT | Performed by: CLINICAL NEUROPSYCHOLOGIST

## 2024-07-08 PROCEDURE — 96116 NUBHVL XM PHYS/QHP 1ST HR: CPT | Performed by: CLINICAL NEUROPSYCHOLOGIST

## 2024-07-09 ENCOUNTER — APPOINTMENT (OUTPATIENT)
Dept: HEMATOLOGY/ONCOLOGY | Facility: CLINIC | Age: 74
End: 2024-07-09
Payer: MEDICARE

## 2024-07-11 ENCOUNTER — APPOINTMENT (OUTPATIENT)
Dept: HEMATOLOGY/ONCOLOGY | Facility: CLINIC | Age: 74
End: 2024-07-11
Payer: MEDICARE

## 2024-07-12 ENCOUNTER — APPOINTMENT (OUTPATIENT)
Dept: HEMATOLOGY/ONCOLOGY | Facility: CLINIC | Age: 74
End: 2024-07-12
Payer: MEDICARE

## 2024-07-15 ENCOUNTER — INFUSION (OUTPATIENT)
Dept: HEMATOLOGY/ONCOLOGY | Facility: CLINIC | Age: 74
End: 2024-07-15
Payer: MEDICARE

## 2024-07-15 VITALS
RESPIRATION RATE: 17 BRPM | HEART RATE: 72 BPM | BODY MASS INDEX: 53.19 KG/M2 | TEMPERATURE: 97.7 F | DIASTOLIC BLOOD PRESSURE: 86 MMHG | SYSTOLIC BLOOD PRESSURE: 140 MMHG | OXYGEN SATURATION: 96 % | WEIGHT: 281.53 LBS

## 2024-07-15 DIAGNOSIS — G43.119 INTRACTABLE MIGRAINE WITH AURA WITHOUT STATUS MIGRAINOSUS: ICD-10-CM

## 2024-07-15 DIAGNOSIS — G43.711 CHRONIC MIGRAINE WITHOUT AURA, WITH INTRACTABLE MIGRAINE, SO STATED, WITH STATUS MIGRAINOSUS: ICD-10-CM

## 2024-07-15 PROCEDURE — 2500000004 HC RX 250 GENERAL PHARMACY W/ HCPCS (ALT 636 FOR OP/ED): Mod: JZ,JG,SE | Performed by: PSYCHIATRY & NEUROLOGY

## 2024-07-15 PROCEDURE — 96365 THER/PROPH/DIAG IV INF INIT: CPT | Mod: INF

## 2024-07-15 RX ORDER — DIPHENHYDRAMINE HYDROCHLORIDE 50 MG/ML
50 INJECTION INTRAMUSCULAR; INTRAVENOUS AS NEEDED
OUTPATIENT
Start: 2024-10-09

## 2024-07-15 RX ORDER — FAMOTIDINE 10 MG/ML
20 INJECTION INTRAVENOUS ONCE AS NEEDED
Status: DISCONTINUED | OUTPATIENT
Start: 2024-07-15 | End: 2024-07-15 | Stop reason: HOSPADM

## 2024-07-15 RX ORDER — HEPARIN 100 UNIT/ML
500 SYRINGE INTRAVENOUS AS NEEDED
OUTPATIENT
Start: 2024-07-15

## 2024-07-15 RX ORDER — HEPARIN SODIUM,PORCINE/PF 10 UNIT/ML
50 SYRINGE (ML) INTRAVENOUS AS NEEDED
OUTPATIENT
Start: 2024-07-15

## 2024-07-15 RX ORDER — ALBUTEROL SULFATE 0.83 MG/ML
3 SOLUTION RESPIRATORY (INHALATION) AS NEEDED
Status: DISCONTINUED | OUTPATIENT
Start: 2024-07-15 | End: 2024-07-15 | Stop reason: HOSPADM

## 2024-07-15 RX ORDER — HEPARIN SODIUM,PORCINE/PF 10 UNIT/ML
50 SYRINGE (ML) INTRAVENOUS AS NEEDED
Status: DISCONTINUED | OUTPATIENT
Start: 2024-07-15 | End: 2024-07-15 | Stop reason: HOSPADM

## 2024-07-15 RX ORDER — ALBUTEROL SULFATE 0.83 MG/ML
3 SOLUTION RESPIRATORY (INHALATION) AS NEEDED
OUTPATIENT
Start: 2024-10-09

## 2024-07-15 RX ORDER — DIPHENHYDRAMINE HYDROCHLORIDE 50 MG/ML
50 INJECTION INTRAMUSCULAR; INTRAVENOUS AS NEEDED
Status: DISCONTINUED | OUTPATIENT
Start: 2024-07-15 | End: 2024-07-15 | Stop reason: HOSPADM

## 2024-07-15 RX ORDER — EPINEPHRINE 0.3 MG/.3ML
0.3 INJECTION SUBCUTANEOUS EVERY 5 MIN PRN
OUTPATIENT
Start: 2024-10-09

## 2024-07-15 RX ORDER — EPINEPHRINE 0.3 MG/.3ML
0.3 INJECTION SUBCUTANEOUS EVERY 5 MIN PRN
Status: DISCONTINUED | OUTPATIENT
Start: 2024-07-15 | End: 2024-07-15 | Stop reason: HOSPADM

## 2024-07-15 RX ORDER — FAMOTIDINE 10 MG/ML
20 INJECTION INTRAVENOUS ONCE AS NEEDED
OUTPATIENT
Start: 2024-10-09

## 2024-07-15 ASSESSMENT — PAIN SCALES - GENERAL: PAINLEVEL: 8

## 2024-07-31 ENCOUNTER — SPECIALTY PHARMACY (OUTPATIENT)
Dept: PHARMACY | Facility: CLINIC | Age: 74
End: 2024-07-31

## 2024-07-31 PROCEDURE — RXMED WILLOW AMBULATORY MEDICATION CHARGE

## 2024-08-01 ENCOUNTER — PHARMACY VISIT (OUTPATIENT)
Dept: PHARMACY | Facility: CLINIC | Age: 74
End: 2024-08-01
Payer: COMMERCIAL

## 2024-08-26 ENCOUNTER — SPECIALTY PHARMACY (OUTPATIENT)
Dept: PHARMACY | Facility: CLINIC | Age: 74
End: 2024-08-26

## 2024-08-26 PROCEDURE — RXMED WILLOW AMBULATORY MEDICATION CHARGE

## 2024-08-27 ENCOUNTER — PHARMACY VISIT (OUTPATIENT)
Dept: PHARMACY | Facility: CLINIC | Age: 74
End: 2024-08-27
Payer: COMMERCIAL

## 2024-09-19 ENCOUNTER — SPECIALTY PHARMACY (OUTPATIENT)
Dept: PHARMACY | Facility: CLINIC | Age: 74
End: 2024-09-19

## 2024-09-19 DIAGNOSIS — G43.711 CHRONIC MIGRAINE WITHOUT AURA, WITH INTRACTABLE MIGRAINE, SO STATED, WITH STATUS MIGRAINOSUS: ICD-10-CM

## 2024-09-23 PROCEDURE — RXMED WILLOW AMBULATORY MEDICATION CHARGE

## 2024-09-26 ENCOUNTER — SPECIALTY PHARMACY (OUTPATIENT)
Dept: PHARMACY | Facility: CLINIC | Age: 74
End: 2024-09-26

## 2024-09-27 ENCOUNTER — PHARMACY VISIT (OUTPATIENT)
Dept: PHARMACY | Facility: CLINIC | Age: 74
End: 2024-09-27
Payer: COMMERCIAL

## 2024-10-04 DIAGNOSIS — R10.9 ABDOMINAL PAIN, UNSPECIFIED ABDOMINAL LOCATION: ICD-10-CM

## 2024-10-04 DIAGNOSIS — N32.81 OVERACTIVE BLADDER: ICD-10-CM

## 2024-10-04 DIAGNOSIS — E78.5 HYPERLIPIDEMIA LDL GOAL <100: Primary | ICD-10-CM

## 2024-10-04 DIAGNOSIS — E03.9 ACQUIRED HYPOTHYROIDISM: ICD-10-CM

## 2024-10-04 RX ORDER — DICYCLOMINE HYDROCHLORIDE 10 MG/1
10 CAPSULE ORAL 4 TIMES DAILY
Qty: 120 CAPSULE | Refills: 11 | Status: SHIPPED | OUTPATIENT
Start: 2024-10-04

## 2024-10-04 RX ORDER — LEVOTHYROXINE SODIUM 125 UG/1
125 TABLET ORAL DAILY
Qty: 90 TABLET | Refills: 0 | Status: SHIPPED | OUTPATIENT
Start: 2024-10-04

## 2024-10-04 RX ORDER — OXYBUTYNIN CHLORIDE 5 MG/1
5 TABLET ORAL 3 TIMES DAILY
Qty: 270 TABLET | Refills: 3 | Status: SHIPPED | OUTPATIENT
Start: 2024-10-04

## 2024-10-04 RX ORDER — ROSUVASTATIN CALCIUM 20 MG/1
20 TABLET, COATED ORAL NIGHTLY
Qty: 90 TABLET | Refills: 3 | Status: SHIPPED | OUTPATIENT
Start: 2024-10-04

## 2024-10-06 DIAGNOSIS — G25.81 RESTLESS LEGS SYNDROME: ICD-10-CM

## 2024-10-07 RX ORDER — ROPINIROLE 3 MG/1
TABLET, FILM COATED ORAL
Qty: 270 TABLET | Refills: 0 | Status: SHIPPED | OUTPATIENT
Start: 2024-10-07

## 2024-10-07 NOTE — TELEPHONE ENCOUNTER
Rx Refill Request Telephone Encounter    Name:  Khadra Wanglbaum  :  [id number]  Medication Name:  Ropinirole 3 mg    Specific Pharmacy location:  [id number] - [place] [address] [place] [address] [address]   Date of last appointment:  24  Date of next appointment:  24  Best number to reach patient:  [phone]

## 2024-10-09 ENCOUNTER — APPOINTMENT (OUTPATIENT)
Dept: HEMATOLOGY/ONCOLOGY | Facility: CLINIC | Age: 74
End: 2024-10-09
Payer: MEDICARE

## 2024-10-14 ENCOUNTER — INFUSION (OUTPATIENT)
Dept: HEMATOLOGY/ONCOLOGY | Facility: CLINIC | Age: 74
End: 2024-10-14
Payer: MEDICARE

## 2024-10-14 VITALS
RESPIRATION RATE: 18 BRPM | HEART RATE: 79 BPM | OXYGEN SATURATION: 100 % | WEIGHT: 275.46 LBS | HEIGHT: 61 IN | BODY MASS INDEX: 52.01 KG/M2 | SYSTOLIC BLOOD PRESSURE: 131 MMHG | TEMPERATURE: 97.3 F | DIASTOLIC BLOOD PRESSURE: 84 MMHG

## 2024-10-14 DIAGNOSIS — G43.119 INTRACTABLE MIGRAINE WITH AURA WITHOUT STATUS MIGRAINOSUS: ICD-10-CM

## 2024-10-14 DIAGNOSIS — G43.711 CHRONIC MIGRAINE WITHOUT AURA, WITH INTRACTABLE MIGRAINE, SO STATED, WITH STATUS MIGRAINOSUS: ICD-10-CM

## 2024-10-14 PROCEDURE — 2500000004 HC RX 250 GENERAL PHARMACY W/ HCPCS (ALT 636 FOR OP/ED): Mod: JZ,JG,SE | Performed by: PSYCHIATRY & NEUROLOGY

## 2024-10-14 PROCEDURE — 96365 THER/PROPH/DIAG IV INF INIT: CPT | Mod: INF

## 2024-10-14 RX ORDER — FAMOTIDINE 10 MG/ML
20 INJECTION INTRAVENOUS ONCE AS NEEDED
OUTPATIENT
Start: 2025-01-11

## 2024-10-14 RX ORDER — HEPARIN 100 UNIT/ML
500 SYRINGE INTRAVENOUS AS NEEDED
OUTPATIENT
Start: 2024-10-14

## 2024-10-14 RX ORDER — ALBUTEROL SULFATE 0.83 MG/ML
3 SOLUTION RESPIRATORY (INHALATION) AS NEEDED
OUTPATIENT
Start: 2025-01-11

## 2024-10-14 RX ORDER — EPINEPHRINE 0.3 MG/.3ML
0.3 INJECTION SUBCUTANEOUS EVERY 5 MIN PRN
OUTPATIENT
Start: 2025-01-11

## 2024-10-14 RX ORDER — HEPARIN SODIUM,PORCINE/PF 10 UNIT/ML
50 SYRINGE (ML) INTRAVENOUS AS NEEDED
OUTPATIENT
Start: 2024-10-14

## 2024-10-14 RX ORDER — DIPHENHYDRAMINE HYDROCHLORIDE 50 MG/ML
50 INJECTION INTRAMUSCULAR; INTRAVENOUS AS NEEDED
OUTPATIENT
Start: 2025-01-11

## 2024-10-14 ASSESSMENT — PAIN SCALES - GENERAL: PAINLEVEL: 8

## 2024-10-14 NOTE — PROGRESS NOTES
Khadra Earl self ambulated to [facility] for Eptinezumab .  Pt tolerated treatment without incident.  Gait steady upon DC home.  Khadra Earl denies further questions/concerns/comments and agrees to POC going forward.

## 2024-11-06 ENCOUNTER — APPOINTMENT (OUTPATIENT)
Dept: PRIMARY CARE | Facility: CLINIC | Age: 74
End: 2024-11-06
Payer: MEDICARE

## 2024-11-06 VITALS
DIASTOLIC BLOOD PRESSURE: 66 MMHG | WEIGHT: 280.8 LBS | BODY MASS INDEX: 53.02 KG/M2 | OXYGEN SATURATION: 94 % | SYSTOLIC BLOOD PRESSURE: 121 MMHG | HEIGHT: 61 IN | HEART RATE: 66 BPM

## 2024-11-06 DIAGNOSIS — R11.11 VOMITING WITHOUT NAUSEA, UNSPECIFIED VOMITING TYPE: ICD-10-CM

## 2024-11-06 DIAGNOSIS — E03.9 ACQUIRED HYPOTHYROIDISM: ICD-10-CM

## 2024-11-06 DIAGNOSIS — Z13.89 SCREENING FOR MULTIPLE CONDITIONS: ICD-10-CM

## 2024-11-06 DIAGNOSIS — Z12.31 ENCOUNTER FOR SCREENING MAMMOGRAM FOR BREAST CANCER: ICD-10-CM

## 2024-11-06 DIAGNOSIS — Z00.00 ROUTINE GENERAL MEDICAL EXAMINATION AT HEALTH CARE FACILITY: ICD-10-CM

## 2024-11-06 DIAGNOSIS — Z71.89 ADVANCED DIRECTIVES, COUNSELING/DISCUSSION: ICD-10-CM

## 2024-11-06 DIAGNOSIS — Z00.00 MEDICARE ANNUAL WELLNESS VISIT, SUBSEQUENT: Primary | ICD-10-CM

## 2024-11-06 DIAGNOSIS — N18.31 CHRONIC KIDNEY DISEASE, STAGE 3A (MULTI): ICD-10-CM

## 2024-11-06 PROCEDURE — 99497 ADVNCD CARE PLAN 30 MIN: CPT | Performed by: NURSE PRACTITIONER

## 2024-11-06 PROCEDURE — 1036F TOBACCO NON-USER: CPT | Performed by: NURSE PRACTITIONER

## 2024-11-06 PROCEDURE — G0444 DEPRESSION SCREEN ANNUAL: HCPCS | Performed by: NURSE PRACTITIONER

## 2024-11-06 PROCEDURE — G0439 PPPS, SUBSEQ VISIT: HCPCS | Performed by: NURSE PRACTITIONER

## 2024-11-06 PROCEDURE — 1158F ADVNC CARE PLAN TLK DOCD: CPT | Performed by: NURSE PRACTITIONER

## 2024-11-06 PROCEDURE — 1170F FXNL STATUS ASSESSED: CPT | Performed by: NURSE PRACTITIONER

## 2024-11-06 PROCEDURE — 1159F MED LIST DOCD IN RCRD: CPT | Performed by: NURSE PRACTITIONER

## 2024-11-06 PROCEDURE — 1157F ADVNC CARE PLAN IN RCRD: CPT | Performed by: NURSE PRACTITIONER

## 2024-11-06 PROCEDURE — 99213 OFFICE O/P EST LOW 20 MIN: CPT | Performed by: NURSE PRACTITIONER

## 2024-11-06 PROCEDURE — 1160F RVW MEDS BY RX/DR IN RCRD: CPT | Performed by: NURSE PRACTITIONER

## 2024-11-06 PROCEDURE — 1123F ACP DISCUSS/DSCN MKR DOCD: CPT | Performed by: NURSE PRACTITIONER

## 2024-11-06 PROCEDURE — 3008F BODY MASS INDEX DOCD: CPT | Performed by: NURSE PRACTITIONER

## 2024-11-06 ASSESSMENT — ACTIVITIES OF DAILY LIVING (ADL)
TAKING_MEDICATION: INDEPENDENT
GROCERY_SHOPPING: INDEPENDENT
MANAGING_FINANCES: INDEPENDENT
DOING_HOUSEWORK: INDEPENDENT
BATHING: INDEPENDENT
DRESSING: INDEPENDENT

## 2024-11-06 ASSESSMENT — ENCOUNTER SYMPTOMS
OCCASIONAL FEELINGS OF UNSTEADINESS: 0
DEPRESSION: 0
LOSS OF SENSATION IN FEET: 0

## 2024-11-06 NOTE — ASSESSMENT & PLAN NOTE
- Counseled on healthy diet and regular exercise  - Fall avoidance information provided  - Personalized prevention plan provided   - Vaccines; refuses flu vaccine   - FBW UTD

## 2024-11-06 NOTE — PROGRESS NOTES
Subjective   Reason for Visit: Khadra Earl is an 73 y.o. female here for a Medicare Wellness visit.     Past Medical, Surgical, and Family History reviewed and updated in chart.    Reviewed all medications by prescribing practitioner or clinical pharmacist (such as prescriptions, OTCs, herbal therapies and supplements) and documented in the medical record.    HPI  Khadra is a 72 yo est female pt with mult chronic co-morbidities who is presenting today accompanied by her daughter for her AWV   LOV: 3/2024    Dx: ANXIETY, DEPRESSION, LYMPHEDEMA, HYPOTHYROIDISM, RLS, MIGRAINES, SEIZURES, MORBID OBESITY     Pt was seen in July by neuropsychology to evaluate for alzheimer's/dementia and was cleared   She gets frustrated with forgetting some things and states she has to write down reminders, which neuro told her is a sign that she does not have dementia as she wouldn't have the capacity to write down the reminders     Pt states her bed is on an incline as 1-2 times/week she will vomit in her sleep  States sometimes she doesn't even know it happened, she will just wake up with vomit on her  States she does not lie down for a few hours after eating  Will see GI for evaluation    #ANXIETY/DEPRESSION  Rx Amitriptyline at bedtime and Wellbutrin daily   Denies SI/HI      #SEIZURES  Neuro: Dr. York   Rx Divalproex 750 mg daily  Last sz: 3 weeks ago      #LYMPHEDEMA  Saw Dr. Mohseni  Does compression treatments at home 2 times/day      #HYPOTHYROIDISM  TSH= 4.55 April 2024  Rx Levothyroxine 125 mcg      #MIGRAINES  Neuro: Dr. York; had injection last month   Rx Ubrelvy, Tizanidine, Sprix nasal         Allergies   Allergen Reactions    Zolpidem Other     Severe loss of balance hearing and SI . Does not work    Other Nausea/vomiting     Local anesthesia    Gabapentin Other     SEVERE GAIT ISSUES    Magnesium Unknown    Magnesium Carbonate Hallucinations    Metoclopramide Hcl Unknown     akathisia    Sertraline Dizziness     "Sulfa (Sulfonamide Antibiotics) Unknown    Adhesive Tape-Silicones Rash       Patient Care Team:  JAVIER Armijo as PCP - General (Family Medicine)  JAVIER Armijo as PCP - MSS[facility] ACO Attributed Provider  Tre De Leon MD (Internal Medicine)       Review of Systems  ROS was completed and all systems are negative with the exception of what was noted in the the HPI.     Objective   Vitals:  /66   Pulse 66   Ht 1.549 m (5' 1\")   Wt 127 kg (280 lb 12.8 oz)   SpO2 94%   BMI 53.06 kg/m²       Current Outpatient Medications   Medication Instructions    acetaminophen (Tylenol) 325 mg tablet Every 8 hours    acetaZOLAMIDE (DIAMOX) 250 mg, oral, Nightly    amitriptyline (Elavil) 10 mg tablet Take 4 tablets (40 mg) by mouth once daily at bedtime for 14 days, THEN 3 tablets (30 mg) once daily at bedtime for 14 days, THEN 2 tablets (20 mg) once daily at bedtime for 14 days, THEN 1 tablet (10 mg) once daily at bedtime for 14 days.    baclofen (LIORESAL) 10 mg, As needed    benzonatate (TESSALON) 100 mg, As needed    buPROPion XL (WELLBUTRIN XL) 150 mg, oral, Every morning, Do not crush, chew, or split.    cetirizine (ZYRTEC) 10 mg, oral, Daily    cholecalciferol, vitamin D3, (VITAMIN D3 ORAL) 1 capsule, Daily    cyanocobalamin (Vitamin B-12) 1,000 mcg tablet 1 tablet, Daily    dicyclomine (BENTYL) 10 mg, oral, 4 times daily    fluocinonide 0.05 % cream 1 Application    furosemide (LASIX) 20 mg, oral, Daily PRN    ketoconazole (NIZOral) 2 % cream 1 Application, Daily    ketorolac (Sprix) nasal 1 SPRAY IN EACH NOSTRIL EVERY 6 TO 8 HOURS. MAXIMUM DOSE 8 SPRAYS IN 24 HOUR PERIOD    ketorolac (Toradol) 10 mg tablet TAKE 1 TABLET BY MOUTH EVERY 6 HOURS WITH FOOD    lamoTRIgine 25 mg (42) -100 mg (7) tablets,dose pack Use pack as directed    levothyroxine (SYNTHROID, LEVOXYL) 125 mcg, oral, Daily    meclizine (ANTIVERT) 25 mg, Every 8 hours PRN    multivitamin (Multiple Vitamins) tablet Take by " mouth.    mupirocin (Bactroban) 2 % ointment APPLY SMALL AMOUNT TOPICALLY TO THE AFFECTED AREA THREE TIMES DAILY AS DIRECTED    oxybutynin (DITROPAN) 5 mg, oral, 3 times daily    potassium chloride CR (Klor-Con) 10 mEq ER tablet 10 mEq, oral, 3 times daily, Do not crush, chew, or split.    promethazine (PHENERGAN) 25 mg, oral, Every 6 hours PRN    rOPINIRole (Requip) 3 mg tablet TAKE 1 TABLET(3 MG) BY MOUTH EVERY 8 HOURS    rosuvastatin (CRESTOR) 20 mg, oral, Nightly    tiZANidine (ZANAFLEX) 4 mg, oral, Nightly, Increase by 1/2 tablet every 3 days. As needed up to maximum of 3 daily    traMADol (Ultram) 50 mg tablet TAKE 1 TO 2 TABLETS BY MOUTH EVERY 6 HOURS AS NEEDED FOR BREAKTHOUGH PAIN.    triamcinolone (Kenalog) 0.1 % cream 3 times daily PRN    ubrogepant (Ubrelvy) 100 mg tablet tablet TAKE ONE (1) TABLET BY MOUTH AT ONSET OF MIGRAINE. MAY REPEAT DOSE IN 2 HOURS IF NEEDED. MAXIMUM  MG (2 TABLETS) IN A 24 HOUR PERIOD.         Physical Exam  Vitals reviewed.   Constitutional:       Appearance: She is obese.   Cardiovascular:      Pulses: Normal pulses.      Heart sounds: Normal heart sounds.   Pulmonary:      Effort: Pulmonary effort is normal.      Breath sounds: Normal breath sounds.   Neurological:      Mental Status: She is oriented to person, place, and time.   Psychiatric:         Mood and Affect: Mood normal.           Assessment & Plan  Medicare annual wellness visit, subsequent  - Counseled on healthy diet and regular exercise  - Fall avoidance information provided  - Personalized prevention plan provided   - Vaccines; refuses flu vaccine   - FBW UTD      Advanced directives, counseling/discussion  I spent > 16 minutes face to face with Khadra Earl discussing his/her advance directives, including a Living Will, Healthcare POA as well as specific end of life choices and/or directives, and pt was provided with packet to return next visit.    HCPOA: Daughter   Pt is Full Code         Screening  for multiple conditions  Depression screening completed using PHQ-2 questions with results documented in the chart/encounter (15 minutes)  See rooming screening section for documentation and/or progress note for additional information.         Encounter for screening mammogram for breast cancer    Orders:    BI mammo bilateral screening tomosynthesis; Future    Routine general medical examination at health care facility    Orders:    1 Year Follow Up In Primary Care - Wellness Exam; Future    Chronic kidney disease, stage 3a (Multi)  Stable   Will continue to monitor  Encouraged hydration     Orders:    Follow Up In Primary Care - Established; Future    Acquired hypothyroidism  Stable   TSH current  Rx levothyroxine 125 mcg daily    Orders:    Follow Up In Primary Care - Established; Future    Vomiting without nausea, unspecified vomiting type  See GI for evaluation   Orders:    Referral to Gastroenterology; Future            Patient was identified as a fall risk. Risk prevention instructions provided.

## 2024-11-06 NOTE — PATIENT INSTRUCTIONS
Thank you for seeing me today Khadra RADHA Earl, it was a pleasure to see you again!    Continue all medications as reviewed during OV today    Schedule Mammogram    See GI about night-time vomiting       For assistance with scheduling referrals or consultations, please call [phone] or [phone].    For laboratory, radiology, and other tests, please call [phone] ([phone] for pediatrics).   If you do not get results within 7-10 days, or you have any questions or concerns, please send a message, call the office ([phone]), or return to the office for a follow-up appointment.     For acute/sick visits, if you are unable to get an office visit, you can do a  On Demand Virtual Visit that is accessible via your My Chart account.  For emergencies, call 9-1-1 or go to the nearest Emergency Department.     Please schedule additional appointment(s) to address concern(s) not addressed today.    Please review prescription inserts and published information for possible adverse effects of all medications.     In general, results are discussed over the phone or via  [employer].     You can see your health information, review clinical summaries from office visits & test results online when you follow your health with MY  Chart, a personal health record.   To sign up go to [email or web address].   If you need assistance with signing up or trouble getting into your account call [employer] Patient Line 24/7 at [phone]           Ways to Help Prevent Falls at Home    Quick Tips   ? Ask for help if you need it. Most people want to help!   ? Get up slowly after sitting or laying down   ? Wear a medical alert device or keep cell phone in your pocket   ? Use night lights, especially areas near a bathroom   ? Keep the items you use often within reach on a small stool or end table   ? Use an assistive device such as walker or cane, as directed by provider/physical therapy   ? Use a non-slip mat and grab bars  in your bathroom. Look for home health sections for best options     Other Areas to Focus On   ? Exercise and nutrition: Regular exercise or taking a falls prevention class are great ways improve strength and balance. Don’t forget to stay hydrated and bring a snack!   ? Medicine side effects: Some medicines can make you sleepy or dizzy, which could cause a fall. Ask your healthcare provider about the side effects your medicines could cause. Be sure to let them know if you take any vitamins or supplements as well.   ? Tripping hazards: Remove items you could trip on, such as loose mats, rugs, cords, and clutter. Wear closed toe shoes with rubber soles.   ? Health and wellness: Get regular checkups with your healthcare provider, plus routine vision and hearing screenings. Talk with your healthcare provider about:   o Your medicines and the possible side effects - bring them in a bag if that is easier!   o Problems with balance or feeling dizzy   o Ways to promote bone health, such as Vitamin D and calcium supplements   o Questions or concerns about falling     *Ask your healthcare team if you have questions     [facility], 2022       RTC 6 months and as needed     Have a nice day!  Sheyla Houston

## 2024-11-06 NOTE — ASSESSMENT & PLAN NOTE
Stable   Will continue to monitor  Encouraged hydration     Orders:    Follow Up In Primary Care - Established; Future

## 2024-11-06 NOTE — ASSESSMENT & PLAN NOTE
I spent > 16 minutes face to face with Khadra Earl discussing his/her advance directives, including a Living Will, Healthcare POA as well as specific end of life choices and/or directives, and pt was provided with packet to return next visit.    HCPOA: Daughter   Pt is Full Code          Patient needing medication refill on oxyCODONE 80mg ER tablets. He gave his new insurance card for the medicare advantage and it is now on file.

## 2024-11-13 ENCOUNTER — APPOINTMENT (OUTPATIENT)
Dept: NEUROLOGY | Facility: HOSPITAL | Age: 74
End: 2024-11-13
Payer: MEDICARE

## 2024-11-19 ENCOUNTER — APPOINTMENT (OUTPATIENT)
Dept: RADIOLOGY | Facility: CLINIC | Age: 74
End: 2024-11-19
Payer: MEDICARE

## 2024-12-02 DIAGNOSIS — M54.2 CHRONIC NECK PAIN: ICD-10-CM

## 2024-12-02 DIAGNOSIS — G89.29 CHRONIC NECK PAIN: ICD-10-CM

## 2024-12-02 RX ORDER — TIZANIDINE 2 MG/1
4 TABLET ORAL NIGHTLY
Qty: 60 TABLET | Refills: 2 | Status: SHIPPED | OUTPATIENT
Start: 2024-12-02 | End: 2025-03-02

## 2024-12-02 NOTE — TELEPHONE ENCOUNTER
[facility] called and stated that Tizanidine 4mg is on long term back order. And is asking that we send in 2mg and write it as 2 tablets.     Adjusting dose and sig and sending it to MD.     Last visit 6/28/24  Next visit 6/20/25

## 2024-12-16 ENCOUNTER — SPECIALTY PHARMACY (OUTPATIENT)
Dept: PHARMACY | Facility: CLINIC | Age: 74
End: 2024-12-16

## 2024-12-16 ENCOUNTER — PHARMACY VISIT (OUTPATIENT)
Dept: PHARMACY | Facility: CLINIC | Age: 74
End: 2024-12-16
Payer: COMMERCIAL

## 2024-12-16 PROCEDURE — RXMED WILLOW AMBULATORY MEDICATION CHARGE

## 2024-12-31 DIAGNOSIS — G43.711 CHRONIC MIGRAINE WITHOUT AURA, WITH INTRACTABLE MIGRAINE, SO STATED, WITH STATUS MIGRAINOSUS: ICD-10-CM

## 2024-12-31 RX ORDER — KETOROLAC TROMETHAMINE 15.75 MG/1
SPRAY, METERED NASAL
Qty: 5 EACH | Refills: 5 | Status: SHIPPED | OUTPATIENT
Start: 2024-12-31

## 2025-01-02 ENCOUNTER — HOSPITAL ENCOUNTER (OUTPATIENT)
Dept: RADIOLOGY | Facility: CLINIC | Age: 75
Discharge: HOME | End: 2025-01-02
Payer: MEDICARE

## 2025-01-02 DIAGNOSIS — Z12.31 ENCOUNTER FOR SCREENING MAMMOGRAM FOR BREAST CANCER: ICD-10-CM

## 2025-01-02 PROCEDURE — 77067 SCR MAMMO BI INCL CAD: CPT

## 2025-01-03 ENCOUNTER — HOSPITAL ENCOUNTER (OUTPATIENT)
Dept: NEUROLOGY | Facility: HOSPITAL | Age: 75
Discharge: HOME | End: 2025-01-03
Payer: MEDICARE

## 2025-01-03 DIAGNOSIS — R56.9 SEIZURES (MULTI): ICD-10-CM

## 2025-01-03 PROCEDURE — 95816 EEG AWAKE AND DROWSY: CPT | Performed by: PSYCHIATRY & NEUROLOGY

## 2025-01-03 PROCEDURE — 95816 EEG AWAKE AND DROWSY: CPT

## 2025-01-10 ENCOUNTER — TELEPHONE (OUTPATIENT)
Dept: NEUROLOGY | Facility: CLINIC | Age: 75
End: 2025-01-10
Payer: MEDICARE

## 2025-01-10 ENCOUNTER — OFFICE VISIT (OUTPATIENT)
Dept: NEUROLOGY | Facility: HOSPITAL | Age: 75
End: 2025-01-10
Payer: MEDICARE

## 2025-01-10 VITALS
WEIGHT: 280 LBS | TEMPERATURE: 96.8 F | DIASTOLIC BLOOD PRESSURE: 77 MMHG | HEART RATE: 68 BPM | BODY MASS INDEX: 52.87 KG/M2 | SYSTOLIC BLOOD PRESSURE: 138 MMHG | HEIGHT: 61 IN | RESPIRATION RATE: 18 BRPM

## 2025-01-10 DIAGNOSIS — G40.909 SEIZURE DISORDER (MULTI): ICD-10-CM

## 2025-01-10 DIAGNOSIS — G43.711 CHRONIC MIGRAINE WITHOUT AURA, WITH INTRACTABLE MIGRAINE, SO STATED, WITH STATUS MIGRAINOSUS: Primary | ICD-10-CM

## 2025-01-10 DIAGNOSIS — G93.2 IIH (IDIOPATHIC INTRACRANIAL HYPERTENSION): ICD-10-CM

## 2025-01-10 DIAGNOSIS — G43.711 CHRONIC MIGRAINE WITHOUT AURA, INTRACTABLE, WITH STATUS MIGRAINOSUS: ICD-10-CM

## 2025-01-10 DIAGNOSIS — R56.9 SEIZURES (MULTI): ICD-10-CM

## 2025-01-10 DIAGNOSIS — R41.3 MEMORY DIFFICULTY: ICD-10-CM

## 2025-01-10 RX ORDER — LAMOTRIGINE 25(42)-100
KIT ORAL
Qty: 1 KIT | Refills: 3 | Status: SHIPPED | OUTPATIENT
Start: 2025-01-10

## 2025-01-10 RX ORDER — KETOROLAC TROMETHAMINE 10 MG/1
10 TABLET, FILM COATED ORAL EVERY 6 HOURS PRN
Qty: 20 TABLET | Refills: 0 | Status: SHIPPED | OUTPATIENT
Start: 2025-01-10 | End: 2025-01-15

## 2025-01-10 RX ORDER — KETOROLAC TROMETHAMINE 30 MG/ML
60 INJECTION, SOLUTION INTRAMUSCULAR; INTRAVENOUS ONCE
Status: SHIPPED | OUTPATIENT
Start: 2025-01-10 | End: 2025-01-15

## 2025-01-10 RX ORDER — CELECOXIB 200 MG/1
200 CAPSULE ORAL DAILY PRN
Qty: 90 CAPSULE | Refills: 3 | Status: SHIPPED | OUTPATIENT
Start: 2025-01-10 | End: 2026-01-10
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
## 2025-01-10 NOTE — ASSESSMENT & PLAN NOTE
Continue the Vyepti try Celebrex for acute therapy    You received Toradol today for your severe headache.  We are going to continue with 5 day of pills starting tomorrow to break your headache cycle.  Take 1 pill with breakfast lunch dinner and bedtime for 5 days.  Take with food.  Try not to take your triptan or antiinflammatory during this time.  If you have any nausea or diarrhea with the medicine stop and call on the next business day.

## 2025-01-13 ENCOUNTER — APPOINTMENT (OUTPATIENT)
Dept: HEMATOLOGY/ONCOLOGY | Facility: CLINIC | Age: 75
End: 2025-01-13
Payer: MEDICARE

## 2025-01-15 ENCOUNTER — INFUSION (OUTPATIENT)
Dept: HEMATOLOGY/ONCOLOGY | Facility: CLINIC | Age: 75
End: 2025-01-15
Payer: MEDICARE

## 2025-01-15 VITALS
HEART RATE: 70 BPM | RESPIRATION RATE: 18 BRPM | WEIGHT: 273.04 LBS | TEMPERATURE: 97.3 F | DIASTOLIC BLOOD PRESSURE: 72 MMHG | BODY MASS INDEX: 51.59 KG/M2 | SYSTOLIC BLOOD PRESSURE: 121 MMHG | OXYGEN SATURATION: 97 %

## 2025-01-15 DIAGNOSIS — G43.711 CHRONIC MIGRAINE WITHOUT AURA, WITH INTRACTABLE MIGRAINE, SO STATED, WITH STATUS MIGRAINOSUS: ICD-10-CM

## 2025-01-15 DIAGNOSIS — G43.119 INTRACTABLE MIGRAINE WITH AURA WITHOUT STATUS MIGRAINOSUS: ICD-10-CM

## 2025-01-15 PROCEDURE — 96365 THER/PROPH/DIAG IV INF INIT: CPT | Mod: INF

## 2025-01-15 PROCEDURE — 2500000004 HC RX 250 GENERAL PHARMACY W/ HCPCS (ALT 636 FOR OP/ED): Mod: JZ,JG,TB | Performed by: PSYCHIATRY & NEUROLOGY

## 2025-01-15 RX ORDER — FAMOTIDINE 10 MG/ML
20 INJECTION INTRAVENOUS ONCE AS NEEDED
OUTPATIENT
Start: 2025-04-12

## 2025-01-15 RX ORDER — FAMOTIDINE 10 MG/ML
20 INJECTION INTRAVENOUS ONCE AS NEEDED
Status: DISCONTINUED | OUTPATIENT
Start: 2025-01-15 | End: 2025-01-15 | Stop reason: HOSPADM

## 2025-01-15 RX ORDER — HEPARIN SODIUM,PORCINE/PF 10 UNIT/ML
50 SYRINGE (ML) INTRAVENOUS AS NEEDED
OUTPATIENT
Start: 2025-01-15

## 2025-01-15 RX ORDER — EPINEPHRINE 0.3 MG/.3ML
0.3 INJECTION SUBCUTANEOUS EVERY 5 MIN PRN
OUTPATIENT
Start: 2025-04-12

## 2025-01-15 RX ORDER — ALBUTEROL SULFATE 0.83 MG/ML
3 SOLUTION RESPIRATORY (INHALATION) AS NEEDED
OUTPATIENT
Start: 2025-04-12

## 2025-01-15 RX ORDER — HEPARIN 100 UNIT/ML
500 SYRINGE INTRAVENOUS AS NEEDED
OUTPATIENT
Start: 2025-01-15

## 2025-01-15 RX ORDER — DIPHENHYDRAMINE HYDROCHLORIDE 50 MG/ML
50 INJECTION INTRAMUSCULAR; INTRAVENOUS AS NEEDED
OUTPATIENT
Start: 2025-04-12

## 2025-01-15 RX ORDER — ALBUTEROL SULFATE 0.83 MG/ML
3 SOLUTION RESPIRATORY (INHALATION) AS NEEDED
Status: DISCONTINUED | OUTPATIENT
Start: 2025-01-15 | End: 2025-01-15 | Stop reason: HOSPADM

## 2025-01-15 RX ORDER — DIPHENHYDRAMINE HYDROCHLORIDE 50 MG/ML
50 INJECTION INTRAMUSCULAR; INTRAVENOUS AS NEEDED
Status: DISCONTINUED | OUTPATIENT
Start: 2025-01-15 | End: 2025-01-15 | Stop reason: HOSPADM

## 2025-01-15 RX ORDER — EPINEPHRINE 0.3 MG/.3ML
0.3 INJECTION SUBCUTANEOUS EVERY 5 MIN PRN
Status: DISCONTINUED | OUTPATIENT
Start: 2025-01-15 | End: 2025-01-15 | Stop reason: HOSPADM

## 2025-01-15 RX ADMIN — EPTINEZUMAB-JJMR 300 MG: 100 INJECTION INTRAVENOUS at 12:57

## 2025-01-15 ASSESSMENT — PAIN SCALES - GENERAL: PAINLEVEL_OUTOF10: 6

## 2025-01-24 ENCOUNTER — TELEPHONE (OUTPATIENT)
Dept: NEUROLOGY | Facility: HOSPITAL | Age: 75
End: 2025-01-24
Payer: MEDICARE

## 2025-01-24 NOTE — TELEPHONE ENCOUNTER
Is concerned after reading side effect of lamictal and is fearful of trying. Instructed the side effects listed are potential side effects and everyone does not experience them. Also, titration of Lamictal starts low for that reason Instr to takes notes before she starts of how she is feeling and her seizure activity and keep tract after she starts of how she is feeling.   States understanding.

## 2025-01-28 PROCEDURE — RXMED WILLOW AMBULATORY MEDICATION CHARGE

## 2025-02-06 ENCOUNTER — PHARMACY VISIT (OUTPATIENT)
Dept: PHARMACY | Facility: CLINIC | Age: 75
End: 2025-02-06
Payer: COMMERCIAL

## 2025-02-06 ENCOUNTER — SPECIALTY PHARMACY (OUTPATIENT)
Dept: PHARMACY | Facility: CLINIC | Age: 75
End: 2025-02-06

## 2025-02-11 NOTE — PROGRESS NOTES
"Subjective   Chief Complaint   Patient presents with    Pain     Khadra is a 73 yo female presenting today for c/o headache, neck and LEFT knee pain. Patient states neck has been going on for over a year and getting worse. Her knee has been about a month not improving. Patient has also had headaches for years and her neurologist put her on new meds for seizures.        Patient ID: Khadra Earl is a 74 y.o. female who presents for Pain (Khadra is a 73 yo female presenting today for c/o headache, neck and LEFT knee pain. Patient states neck has been going on for over a year and getting worse. Her knee has been about a month not improving. Patient has also had headaches for years and her neurologist put her on new meds for seizures. ).    HPI  Khadra is a 73 yo female presenting today for c/o neck, and LEFT knee pain which have all been ongoing for \"years\"   LOV: 11/6/2024    Pt states her LEFT knee has been bothering her for 1.5 weeks   She denies any recent fall   Would like an xray and will order PT    She has been having epigastric pain x 2 weeks  She feels like all food is getting stuck   Pt does vomit frequently, not daily   She has hx of hiatal hernia   Has not seen GI for years   Pt takes Toradol per neurology for migraines  Pt is not on PPI     Pt also c/o \"bumps on right side of neck\"  Onset: 1 week  Denies ear pain  Sore throat       Allergies   Allergen Reactions    Zolpidem Other     Severe loss of balance hearing and SI . Does not work    Other Nausea/vomiting     Local anesthesia    Gabapentin Other     SEVERE GAIT ISSUES    Magnesium Unknown    Magnesium Carbonate Hallucinations    Metoclopramide Hcl Unknown     akathisia    Sertraline Dizziness    Sulfa (Sulfonamide Antibiotics) Unknown    Adhesive Tape-Silicones Rash       Review of Systems  ROS was completed and all systems are negative with the exception of what was noted in the the HPI.       Objective   Pulse 65   Ht 1.549 m (5' 1\")   Wt 126 " kg (278 lb 9.6 oz)   SpO2 96%   BMI 52.64 kg/m²      Current Outpatient Medications   Medication Instructions    acetaminophen (Tylenol) 325 mg tablet Every 8 hours    amitriptyline (Elavil) 25 mg tablet Nightly    baclofen (LIORESAL) 10 mg, As needed    benzonatate (TESSALON) 100 mg, As needed    celecoxib (CELEBREX) 200 mg, oral, Daily PRN    cetirizine (ZYRTEC) 10 mg, oral, Daily    cholecalciferol, vitamin D3, (VITAMIN D3 ORAL) 1 capsule, Daily    cyanocobalamin (Vitamin B-12) 1,000 mcg tablet 1 tablet, Daily    dicyclomine (BENTYL) 10 mg, oral, 4 times daily    eptinezumab (Vyepti) IV in 100 mL  mg, Once    fluocinonide 0.05 % cream 1 Application    furosemide (LASIX) 20 mg, oral, Daily PRN    ketoconazole (NIZOral) 2 % cream 1 Application, Daily    ketorolac (Sprix) nasal 1 SPRAY IN EACH NOSTRIL EVERY 6 TO 8 HOURS. MAXIMUM DOSE 8 SPRAYS IN 24 HOUR PERIOD    ketorolac (Toradol) 10 mg tablet     lamoTRIgine 25 mg (42) -100 mg (7) tablets,dose pack Use pack as directed    levothyroxine (SYNTHROID, LEVOXYL) 125 mcg, oral, Daily    meclizine (ANTIVERT) 25 mg, Every 8 hours PRN    multivitamin (Multiple Vitamins) tablet Take by mouth.    mupirocin (Bactroban) 2 % ointment APPLY SMALL AMOUNT TOPICALLY TO THE AFFECTED AREA THREE TIMES DAILY AS DIRECTED    oxybutynin (DITROPAN) 5 mg, oral, 3 times daily    potassium chloride CR (Klor-Con) 10 mEq ER tablet 10 mEq, oral, 3 times daily, Do not crush, chew, or split.    rOPINIRole (Requip) 3 mg tablet TAKE 1 TABLET(3 MG) BY MOUTH EVERY 8 HOURS    rosuvastatin (CRESTOR) 20 mg, oral, Nightly    sucralfate (CARAFATE) 1 g, 4 times daily before meals and nightly    traMADol (Ultram) 50 mg tablet Take by mouth.    ubrogepant (Ubrelvy) 100 mg tablet tablet TAKE ONE (1) TABLET BY MOUTH AT ONSET OF MIGRAINE. MAY REPEAT DOSE IN 2 HOURS IF NEEDED. MAXIMUM  MG (2 TABLETS) IN A 24 HOUR PERIOD.         Physical Exam  Vitals reviewed.   Constitutional:       Appearance: She  is obese.   Cardiovascular:      Pulses: Normal pulses.      Heart sounds: Normal heart sounds.   Pulmonary:      Breath sounds: Normal breath sounds.   Lymphadenopathy:      Cervical: Cervical adenopathy present.      Right cervical: Posterior cervical adenopathy present.   Neurological:      Mental Status: She is alert and oriented to person, place, and time.         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    Continue current medications and follow-up at least yearly.         Disease of spinal cord, unspecified  Condition stable based on symptoms and exam.    Continue current medications and follow-up at least yearly.         Continuous opioid dependence (Multi)  Condition stable based on symptoms and exam.    Continue current medications and follow-up at least yearly.         Hemiplegia and hemiparesis following cerebral infarction affecting left non-dominant side (Multi)  Condition stable based on symptoms and exam.    Continue current medications and follow-up at least yearly.         Body mass index (BMI) 50.0-59.9, adult (Multi)  Condition stable based on symptoms and exam.    Continue current medications and follow-up at least yearly.  In a face to face session, I informed patient of his/her BMI > 30.  We discussed appropriate nutrition choices and exercise plan to help achieve weight reduction.   Aim for 60 gm of Protein daily  Drink 60 oz of Water  daily  Exercise 60 min EVERYDAY          Chronic kidney disease, stage 3a (Multi)  Condition stable based on symptoms and exam.    Continue current medications and follow-up at least yearly.           Patient was identified as a fall risk. Risk prevention instructions provided.

## 2025-02-12 ENCOUNTER — OFFICE VISIT (OUTPATIENT)
Dept: PRIMARY CARE | Facility: CLINIC | Age: 75
End: 2025-02-12
Payer: MEDICARE

## 2025-02-12 ENCOUNTER — HOSPITAL ENCOUNTER (OUTPATIENT)
Dept: RADIOLOGY | Facility: HOSPITAL | Age: 75
Discharge: HOME | End: 2025-02-12
Payer: MEDICARE

## 2025-02-12 VITALS — HEIGHT: 61 IN | OXYGEN SATURATION: 96 % | BODY MASS INDEX: 52.6 KG/M2 | WEIGHT: 278.6 LBS | HEART RATE: 65 BPM

## 2025-02-12 DIAGNOSIS — F11.20 CONTINUOUS OPIOID DEPENDENCE (MULTI): ICD-10-CM

## 2025-02-12 DIAGNOSIS — M25.562 ACUTE PAIN OF LEFT KNEE: ICD-10-CM

## 2025-02-12 DIAGNOSIS — N18.31 CHRONIC KIDNEY DISEASE, STAGE 3A (MULTI): ICD-10-CM

## 2025-02-12 DIAGNOSIS — R59.0 LYMPHADENOPATHY, CERVICAL: ICD-10-CM

## 2025-02-12 DIAGNOSIS — K43.9 VENTRAL HERNIA WITHOUT OBSTRUCTION OR GANGRENE: ICD-10-CM

## 2025-02-12 DIAGNOSIS — G95.9 DISEASE OF SPINAL CORD, UNSPECIFIED: ICD-10-CM

## 2025-02-12 DIAGNOSIS — R10.13 EPIGASTRIC PAIN: Primary | ICD-10-CM

## 2025-02-12 DIAGNOSIS — I69.354 HEMIPLEGIA AND HEMIPARESIS FOLLOWING CEREBRAL INFARCTION AFFECTING LEFT NON-DOMINANT SIDE (MULTI): ICD-10-CM

## 2025-02-12 DIAGNOSIS — F33.2 SEVERE EPISODE OF RECURRENT MAJOR DEPRESSIVE DISORDER, WITHOUT PSYCHOTIC FEATURES (MULTI): ICD-10-CM

## 2025-02-12 PROCEDURE — 1123F ACP DISCUSS/DSCN MKR DOCD: CPT | Performed by: NURSE PRACTITIONER

## 2025-02-12 PROCEDURE — 1036F TOBACCO NON-USER: CPT | Performed by: NURSE PRACTITIONER

## 2025-02-12 PROCEDURE — 99213 OFFICE O/P EST LOW 20 MIN: CPT | Performed by: NURSE PRACTITIONER

## 2025-02-12 PROCEDURE — 73562 X-RAY EXAM OF KNEE 3: CPT | Mod: LT

## 2025-02-12 PROCEDURE — 1159F MED LIST DOCD IN RCRD: CPT | Performed by: NURSE PRACTITIONER

## 2025-02-12 PROCEDURE — 1160F RVW MEDS BY RX/DR IN RCRD: CPT | Performed by: NURSE PRACTITIONER

## 2025-02-12 PROCEDURE — G2211 COMPLEX E/M VISIT ADD ON: HCPCS | Performed by: NURSE PRACTITIONER

## 2025-02-12 PROCEDURE — 1157F ADVNC CARE PLAN IN RCRD: CPT | Performed by: NURSE PRACTITIONER

## 2025-02-12 RX ORDER — AMITRIPTYLINE HYDROCHLORIDE 25 MG/1
TABLET, FILM COATED ORAL NIGHTLY
COMMUNITY

## 2025-02-12 RX ORDER — TRAMADOL HYDROCHLORIDE 50 MG/1
TABLET ORAL
COMMUNITY

## 2025-02-12 RX ORDER — SUCRALFATE 1 G/1
1 TABLET ORAL
COMMUNITY

## 2025-02-12 ASSESSMENT — ENCOUNTER SYMPTOMS
OCCASIONAL FEELINGS OF UNSTEADINESS: 1
LOSS OF SENSATION IN FEET: 0
DEPRESSION: 0

## 2025-02-12 NOTE — PATIENT INSTRUCTIONS
Epigastric pain  See GI   Hx of ventral hernia   Orders:    Referral to Gastroenterology; Future    Acute pain of left knee  Xray today  Schedule PT   Weight loss encouraged   Orders:    XR knee left 3 views; Future    Referral to Physical Therapy; Future    Lymphadenopathy, cervical  Monitor, call if get larger  Take Tylenol 650 mg every 6 hours       Ways to Help Prevent Falls at Home    Quick Tips   ? Ask for help if you need it. Most people want to help!   ? Get up slowly after sitting or laying down   ? Wear a medical alert device or keep cell phone in your pocket   ? Use night lights, especially areas near a bathroom   ? Keep the items you use often within reach on a small stool or end table   ? Use an assistive device such as walker or cane, as directed by provider/physical therapy   ? Use a non-slip mat and grab bars in your bathroom. Look for home health sections for best options     Other Areas to Focus On   ? Exercise and nutrition: Regular exercise or taking a falls prevention class are great ways improve strength and balance. Don’t forget to stay hydrated and bring a snack!   ? Medicine side effects: Some medicines can make you sleepy or dizzy, which could cause a fall. Ask your healthcare provider about the side effects your medicines could cause. Be sure to let them know if you take any vitamins or supplements as well.   ? Tripping hazards: Remove items you could trip on, such as loose mats, rugs, cords, and clutter. Wear closed toe shoes with rubber soles.   ? Health and wellness: Get regular checkups with your healthcare provider, plus routine vision and hearing screenings. Talk with your healthcare provider about:   o Your medicines and the possible side effects - bring them in a bag if that is easier!   o Problems with balance or feeling dizzy   o Ways to promote bone health, such as Vitamin D and calcium supplements   o Questions or concerns about falling     *Ask your healthcare team if you have  questions     ©[facility], 2022     RTC AS SCHEDULED IN MAY

## 2025-02-12 NOTE — ASSESSMENT & PLAN NOTE
Condition stable based on symptoms and exam.    Continue current medications and follow-up at least yearly.  In a face to face session, I informed patient of his/her BMI > 30.  We discussed appropriate nutrition choices and exercise plan to help achieve weight reduction.   Aim for 60 gm of Protein daily  Drink 60 oz of Water daily  Exercise 60 min EVERYDAY

## 2025-02-12 NOTE — ASSESSMENT & PLAN NOTE
Condition stable based on symptoms and exam.    Continue current medications and follow-up at least yearly.          Detail Level: Generalized Detail Level: Zone

## 2025-02-12 NOTE — ASSESSMENT & PLAN NOTE
Xray today  Schedule PT   Weight loss encouraged   Orders:    XR knee left 3 views; Future    Referral to Physical Therapy; Future

## 2025-02-13 DIAGNOSIS — M25.562 ACUTE PAIN OF LEFT KNEE: Primary | ICD-10-CM

## 2025-02-19 NOTE — PROGRESS NOTES
Verbal consent of the patient and/or verbal parental consent for patients under the age of 18 have been obtained to conduct a physical examination at this office visit.  The , Joyce Martinez, was present in the room during the entire visit including, but not limited to the physical examination!.     New patient  History Of Present Illness  02/20/25 Khadra Earl is a 74 y.o. female who presents for an evaluation of their Left KNEE. The patient states that she has started noticing her left knee pain three weeks ago but doesn't recall doing anything to cause the pain and she denies falling on it and she states that she is not very activeThe patient does not have stairs at her house so she doesn't often use stairs and she states that her knee has not given out on her.  She states that she also woke up yesterday and noticed redness and swelling in her Left lower leg that is still going on today. She states that she has also noticed the right leg with redness and swelling today too.  She states that she initially went to her primary care where they did x-rays.   The patient has a past history of knee replacements in both knees and tried calling the doctor who did her knee replacement be he is no longer working.  The patient sates that her pain is a 5/10 today.   The patient states that she does see a vascular doctor and it was recommended that she schedules an appointment with that doctor.      All previous Progress Notes and imaging results related to this patients chief complaint have been reviewed in preparation for this examination.    Past Medical History  She has a past medical history of Acute bronchitis, unspecified (04/18/2022), Bariatric surgery status (10/28/2020), Other chest pain (04/18/2022), Other conditions influencing health status (04/18/2022), Personal history of malignant melanoma of skin, Personal history of other specified conditions (06/07/2021), and Personal history of other  specified conditions (03/18/2022).    Surgical History  She has a past surgical history that includes Tonsillectomy (04/10/2014); Appendectomy (04/10/2014); Hemicolectomy (04/10/2014); Dilation and curettage of uterus (04/10/2014); Cholecystectomy (04/10/2014); Small intestine surgery (04/10/2014); Total knee arthroplasty (04/10/2014); Other surgical history (04/10/2014); Other surgical history (04/10/2014); Breast lumpectomy (04/10/2014); Lymph node biopsy (04/10/2014); Hernia repair (04/10/2014); Other surgical history (08/10/2021); Other surgical history (02/16/2022); Adenoidectomy (02/01/2018); CT guided imaging for needle placement (06/11/2012); and Breast biopsy (Bilateral).     Social History  She reports that she has never smoked. She has never been exposed to tobacco smoke. She has never used smokeless tobacco. She reports that she does not drink alcohol and does not use drugs.    Family History  Family History   Problem Relation Name Age of Onset    Uterine cancer Mother      Ovarian cancer Mother      Alzheimer's disease Mother      Anxiety disorder Father      Skin cancer Father      Alzheimer's disease Father      Breast cancer Sister  35    Alzheimer's disease Sister      Ovarian cancer Maternal Grandmother          Allergies  Zolpidem, Other, Gabapentin, Magnesium, Magnesium carbonate, Metoclopramide hcl, Sertraline, Sulfa (sulfonamide antibiotics), and Adhesive tape-silicones    Review of Systems  CONSTITUTIONAL:   Negative for weight change, loss of appetite, fatigue, weakness, fever, chills, night sweats, headaches .           HEENT:   Negative for cold, cough, sore throat, sinus pain, swollen lymph nodes.           OPHTHALMOLOGY:   Negative for diminished vision, blurred vision, loss of vision, double vision.           ALLERGY:   Negative for runny nose, scratchy throat, sinus congestion, rash, facial pressure, nasal congestion, post-nasal drip.           CARDIOLOGY:   Negative for chest pain,  palpitations, murmurs, irregular heart beat, shortness of breath, leg edema, dyspnea on exertion, fatigue, dizziness.           RESPIRATORY:   Negative for chest pain, shortness of breath, swelling of the legs, asthma/copd, chest congestion, pain with breathing .           GASTROENTEROLOGY:   Negative for nausea, vomitting, heartburn, constipation, diarrhea, blood in stool, change in bowel habits, black stool.           HEMATOLOGY/LYMPH:   Negative for fatigue, loss of appetitie, easy bruising, easy bleeding, anemia, abnormal bleeding, slow healing.           ENDOCRINOLOGY:   Negative for polyuria, polydipsia, polyphagia, fatigue, weight loss, weight gain, cold intolerance, heat intolerance, diabetes.           MUSCULOSKELETAL:   Positive  for Left knee pain        DERMATOLOGY:   Negative for rash, bruising.           NEUROLOGY:   Negative for tingling, numbness, gait abnormality, paresthesias, weakness, sciatica.        Examination:  Location: LEFT Knee medial and lateral joint line.  BILATERAL Lower Leg.  Edema: Positive  Diffusely. Bilateral pitting edema in lower leg (+2)  Effusion: Negative.   Percussion Test:  Negative.   Tuning Fork Test:  Negative.   Ecchymosis/Bruising: Negative.   Observation: Positive bilateral leatherly and shiny appearance of lower legs as well as painful red and darkened skin that is very taunt.             Palpation:   Positive  Left medial and lateral joint line pain as well as distal quadricep    Orientation: Positive  Asymmetrical: because of the swelling.     Range of Motion:    Positive Knee Flexion ( 0-135 degrees) Decreased because of pain and swelling  Positive Knee Extension ( 0-15 degrees) Decreased because of pain and swelling           Muscle Strength:    Positive +4/+5 Quadricep Extension Causes pain  Positive +4/+5 Hamstring Flexion Causes pain  +5+5 Gastrocnemius  +5+5 Soleus.            Proprioception:   Pain with Squat: Positive    Pain with Sumo Squat:  Positive    Hop Test: Positive    Single leg balance test Positive            Vascular:   +2/+4 Femoral  +2/+4 Dorsalis Pedis  +2/+4 Posterior Tibial  Capillary Refill less than 2 seconds.           KNEE - MCL / LCL:  Valgus Stress Test: 90 degrees:Negative. 20-30 degrees: Negative.   Varus Stress Test:  90 degrees: Negative, 20-30 degrees: Negative.   Apley Distraction Test:Negative.   Thessaly Test: Positive unable to perform because of pain            KNEE - PATELLA:  Apprehension Test:  Positive   Glide Test:  Positive   Grind Test: Positive   Patella Tracking Test: Positive              KNEE - QUADRICEPS:         VMO Test: Positive   VLO Test:  Negative    Hip/Pelvis - Sacrum:  Leg Length Supine: Positive Will verify at future office visit due to pain and limited ROM   Leg Length Supine to Seated (Derbolowsky Sign): Positive Will verify at future office visit due to pain and limited ROM    Hip Flexor Tightness: Positive BILATERAL  Hamstring Tightness: Positive BILATERAL          Feet/Foot: Positive BILATERAL Valgus foot      Lower Leg:  Sejal's Sign: Positive B/L    [facility]  [place]:  XR knee left 3 views  Order date: 2/12/2025  Authorizing: JAVIER Armijo  Ordered by JAVIER Armijo on 2/12/2025.     Narrative & Impression    Interpreted By:  Schoenberger, Joseph,   STUDY:  XR KNEE LEFT 3 VIEWS; ;  2/12/2025 8:42 am      INDICATION:  Signs/Symptoms:knee pain.      ,M25.562 Pain in left knee      COMPARISON:  04/02/2023      ACCESSION NUMBER(S):  [id number]      ORDERING CLINICIAN:  CODI PEREZ      FINDINGS:  Lucency is developing superior to the medial aspect of the femoral  component of the left knee arthroplasty. This may be due to loosening  or stress shielding.. No acute fracture is noted.      IMPRESSION:  Developing lucency at the metal bone interface at the medial aspect  of the femoral component of the total knee arthroplasty. This may be due to loosening  or stress  shielding. See discussion above.      Signed by: Joseph Schoenberger 2/13/2025 9:09 AM  Dictation workstation:   [id number]       Assessment   1. Chronic venous hypertension (idiopathic) with inflammation of bilateral lower extremity        2. Chronic pain of left knee  CT knee left w and wo IV contrast    Referral to Physical Therapy      3. History of total knee replacement, left  CT knee left w and wo IV contrast    Referral to Physical Therapy      4. Lower leg edema  CT knee left w and wo IV contrast    Creatinine, Serum    Vascular US lower extremity venous duplex bilateral    Referral to Physical Therapy    Creatinine, Serum      5. Lipedema        6. Lymphedema        7. Pain and swelling of left lower leg        8. Pain and swelling of right lower leg        9. Varicose veins of both lower extremities with inflammation        10. Venous insufficiency        11. Failed total left knee replacement, initial encounter (CMS-HCC)        12. Bilateral cellulitis of lower leg            Treatment or Intervention:  Continue to alternate ice and moist heat as needed  ,   Start into aquatic/physical Therapy 1-2 times a week for 8-10 weeks with manual therapy as well as dry needling and IASTM  ,   Stressed the importance of wearing shoes with good stability control to help with the biomechanics affecting the knees as well as the lower extremities  ,   Stressed the importance of wearing full foot insoles to help with the biomechanics affecting the knees as well as the lower extremities,   Recommendation over-the-counter calcium with vitamin-D 2 -3000+ milligrams a day, as well as a daily multivitamin.  ,   Recommendation over-the-counter curcumin, turmeric, boswellia, as well as egg shell membrane as directed to aid with joint inflammation.  ,   Recommendation over-the-counter move Free to help for joint health  Patient advised regarding the risks and/or potential adverse reactions and/or side effects of any  prescribed medications along with any over-the-counter medications or any supplements used. Patient advised to seek immediate medical care if any adverse reactions occur. The patient and/or patient(s) parent(s) verbalized their understanding,   Discussed in detail with the patient to the level of their understanding the possibility in the future of regenerative injections versus corticosteroid injections versus viscosupplementation injections versus a combination  CT scan with and without contrast of the left knee to further evaluate for previous hardware loosening from knee replacement  Possibility of future referral back to orthopedics for revision of previous knee replacement  Immediate Referral to Dr. Mcmanus for Stat Ultrasound bilateral lower extremities for DVT as well as consult for lymphedema, lipedema, and venous insufficiency as well as chronic venous hypertension affecting lower extremities she will go straight from our office over to Dr. Mcmanus's  Additionally Dr. Mcmanus will put her on antibiotic for cellulitis after she sees her  Follow-up after CT scan with and without contrast of the left knee or sooner if needed    Please note that this report has been produced using speech recognition software.  It may contain errors related to grammar, punctuation or spelling.  Electronically signed, but not reviewed.  DON Yoon, Director of Sports Medicine   IVON BARRAZA on 2/20/25 at 1:38 PM.     ABIEL Yoon DO

## 2025-02-20 ENCOUNTER — OFFICE VISIT (OUTPATIENT)
Dept: SPORTS MEDICINE | Facility: CLINIC | Age: 75
End: 2025-02-20
Payer: MEDICARE

## 2025-02-20 VITALS
HEIGHT: 61 IN | DIASTOLIC BLOOD PRESSURE: 78 MMHG | BODY MASS INDEX: 52.45 KG/M2 | WEIGHT: 277.78 LBS | HEART RATE: 64 BPM | SYSTOLIC BLOOD PRESSURE: 120 MMHG

## 2025-02-20 DIAGNOSIS — Z96.652 HISTORY OF TOTAL KNEE REPLACEMENT, LEFT: ICD-10-CM

## 2025-02-20 DIAGNOSIS — I87.323 CHRONIC VENOUS HYPERTENSION (IDIOPATHIC) WITH INFLAMMATION OF BILATERAL LOWER EXTREMITY: Primary | ICD-10-CM

## 2025-02-20 DIAGNOSIS — M79.662 PAIN AND SWELLING OF LEFT LOWER LEG: ICD-10-CM

## 2025-02-20 DIAGNOSIS — I83.12 VARICOSE VEINS OF BOTH LOWER EXTREMITIES WITH INFLAMMATION: ICD-10-CM

## 2025-02-20 DIAGNOSIS — R60.9 LIPEDEMA: ICD-10-CM

## 2025-02-20 DIAGNOSIS — I87.2 VENOUS INSUFFICIENCY: ICD-10-CM

## 2025-02-20 DIAGNOSIS — L03.115 BILATERAL CELLULITIS OF LOWER LEG: ICD-10-CM

## 2025-02-20 DIAGNOSIS — M25.562 CHRONIC PAIN OF LEFT KNEE: ICD-10-CM

## 2025-02-20 DIAGNOSIS — M79.89 PAIN AND SWELLING OF LEFT LOWER LEG: ICD-10-CM

## 2025-02-20 DIAGNOSIS — I89.0 LYMPHEDEMA: ICD-10-CM

## 2025-02-20 DIAGNOSIS — M79.89 PAIN AND SWELLING OF RIGHT LOWER LEG: ICD-10-CM

## 2025-02-20 DIAGNOSIS — L03.116 BILATERAL CELLULITIS OF LOWER LEG: ICD-10-CM

## 2025-02-20 DIAGNOSIS — I83.11 VARICOSE VEINS OF BOTH LOWER EXTREMITIES WITH INFLAMMATION: ICD-10-CM

## 2025-02-20 DIAGNOSIS — M79.661 PAIN AND SWELLING OF RIGHT LOWER LEG: ICD-10-CM

## 2025-02-20 DIAGNOSIS — G89.29 CHRONIC PAIN OF LEFT KNEE: ICD-10-CM

## 2025-02-20 DIAGNOSIS — R60.0 LOWER LEG EDEMA: ICD-10-CM

## 2025-02-20 DIAGNOSIS — T84.093A FAILED TOTAL LEFT KNEE REPLACEMENT, INITIAL ENCOUNTER (CMS-HCC): ICD-10-CM

## 2025-02-20 PROCEDURE — 1036F TOBACCO NON-USER: CPT | Performed by: FAMILY MEDICINE

## 2025-02-20 PROCEDURE — 99215 OFFICE O/P EST HI 40 MIN: CPT | Performed by: FAMILY MEDICINE

## 2025-02-20 PROCEDURE — 1123F ACP DISCUSS/DSCN MKR DOCD: CPT | Performed by: FAMILY MEDICINE

## 2025-02-20 PROCEDURE — 99205 OFFICE O/P NEW HI 60 MIN: CPT | Performed by: FAMILY MEDICINE

## 2025-02-20 PROCEDURE — 1157F ADVNC CARE PLAN IN RCRD: CPT | Performed by: FAMILY MEDICINE

## 2025-02-20 NOTE — PATIENT INSTRUCTIONS
Continue to alternate ice and moist heat as needed  ,   Start into Physical Therapy 1-2 times a week for 8-10 weeks with manual therapy as well as dry needling and IASTM  ,   Stressed the importance of wearing shoes with good stability control to help with the biomechanics affecting the knees as well as the lower extremities  ,   Stressed the importance of wearing full foot insoles to help with the biomechanics affecting the knees as well as the lower extremities,   Recommendation over-the-counter calcium with vitamin-D 2 -3000+ milligrams a day, as well as OTC symphytum as directed daily to promote bony healing, in addition to a daily multivitamin.  ,   Recommendation over-the-counter curcumin, turmeric, boswellia, as well as egg shell membrane as directed to aid with joint inflammation.  ,   Patient advised regarding the risks and/or potential adverse reactions and/or side effects of any prescribed medications along with any over-the-counter medications or any supplements used. Patient advised to seek immediate medical care if any adverse reactions occur. The patient and/or patient(s) parent(s) verbalized their understanding,   Discussed in detail with the patient to the level of their understanding the possibility in the future of regenerative injections versus corticosteroid injections versus viscosupplementation injections versus a combination  Follow-up after CT Scan  Immediate Referral to Dr. Mcmanus for [facility]ound  [facility]  [address] [place] [address]  [phone]     You have been ordered an CT Scan of the LEFT Knee. Once you contact scheduling at [phone] and obtain the date and time of your MRI/MR Arthrogram, contact our office at [phone] to schedule your follow-up appointment to review your results.

## 2025-03-01 LAB
CREAT SERPL-MCNC: 0.92 MG/DL (ref 0.6–1)
EGFRCR SERPLBLD CKD-EPI 2021: 65 ML/MIN/1.73M2

## 2025-03-03 ENCOUNTER — HOSPITAL ENCOUNTER (OUTPATIENT)
Dept: RADIOLOGY | Facility: HOSPITAL | Age: 75
Discharge: HOME | End: 2025-03-03
Payer: MEDICARE

## 2025-03-03 ENCOUNTER — TELEPHONE (OUTPATIENT)
Dept: NEUROLOGY | Facility: CLINIC | Age: 75
End: 2025-03-03
Payer: MEDICARE

## 2025-03-03 DIAGNOSIS — G89.29 CHRONIC PAIN OF LEFT KNEE: ICD-10-CM

## 2025-03-03 DIAGNOSIS — M25.562 CHRONIC PAIN OF LEFT KNEE: ICD-10-CM

## 2025-03-03 DIAGNOSIS — R60.0 LOWER LEG EDEMA: ICD-10-CM

## 2025-03-03 DIAGNOSIS — Z96.652 HISTORY OF TOTAL KNEE REPLACEMENT, LEFT: ICD-10-CM

## 2025-03-03 PROCEDURE — 73700 CT LOWER EXTREMITY W/O DYE: CPT | Mod: LT

## 2025-03-03 NOTE — TELEPHONE ENCOUNTER
Khadra called stating the pharmacist let her know the the Lamotrigine was sent in as the starter pack again. The regular dose is needed.

## 2025-03-04 ENCOUNTER — SPECIALTY PHARMACY (OUTPATIENT)
Dept: PHARMACY | Facility: CLINIC | Age: 75
End: 2025-03-04

## 2025-03-04 DIAGNOSIS — G25.81 RESTLESS LEGS SYNDROME: ICD-10-CM

## 2025-03-04 RX ORDER — ROPINIROLE 3 MG/1
3 TABLET, FILM COATED ORAL NIGHTLY
Qty: 270 TABLET | Refills: 0 | Status: SHIPPED | OUTPATIENT
Start: 2025-03-04

## 2025-03-10 NOTE — PROGRESS NOTES
Verbal consent of the patient and/or verbal parental consent for patients under the age of 18 have been obtained to conduct a physical examination at this office visit.  The , [facility], was present in the room during the entire visit including, but not limited to the physical examination!.     New patient  History Of Present Illness  03/13/25 Khadra Earl is a 74 y.o. female who presents for CT scan review of their Left KNEE. States that she is having 6/10 pain in the left knee with decreased swelling since the last time she was seen. States that her pains are still in the same spots as before.  I stressed to her the importance of getting into physical therapy.  We talked in detail about the CT scan results and I recommended she get started into aquatic therapy.  I stressed to her the importance of good shoes as well as shoes with good stability control as well as insoles in her shoes.  We also talked about potential injections in the future down the line.    All previous Progress Notes and imaging results related to this patients chief complaint have been reviewed in preparation for this examination.    Past Medical History  She has a past medical history of Acute bronchitis, unspecified (04/18/2022), Bariatric surgery status (10/28/2020), Other chest pain (04/18/2022), Other conditions influencing health status (04/18/2022), Personal history of malignant melanoma of skin, Personal history of other specified conditions (06/07/2021), and Personal history of other specified conditions (03/18/2022).    Surgical History  She has a past surgical history that includes Tonsillectomy (04/10/2014); Appendectomy (04/10/2014); Hemicolectomy (04/10/2014); Dilation and curettage of uterus (04/10/2014); Cholecystectomy (04/10/2014); Small intestine surgery (04/10/2014); Total knee arthroplasty (04/10/2014); Other surgical history (04/10/2014); Other surgical history (04/10/2014); Breast lumpectomy  (04/10/2014); Lymph node biopsy (04/10/2014); Hernia repair (04/10/2014); Other surgical history (08/10/2021); Other surgical history (02/16/2022); Adenoidectomy (02/01/2018); CT guided imaging for needle placement (06/11/2012); and Breast biopsy (Bilateral).     Social History  She reports that she has never smoked. She has never been exposed to tobacco smoke. She has never used smokeless tobacco. She reports that she does not drink alcohol and does not use drugs.    Family History  Family History   Problem Relation Name Age of Onset    Uterine cancer Mother      Ovarian cancer Mother      Alzheimer's disease Mother      Anxiety disorder Father      Skin cancer Father      Alzheimer's disease Father      Breast cancer Sister  35    Alzheimer's disease Sister      Ovarian cancer Maternal Grandmother          Historical Clinical Intake:  02/20/25 Khadra Earl is a 74 y.o. female who presents for an evaluation of their Left KNEE. The patient states that she has started noticing her left knee pain three weeks ago but doesn't recall doing anything to cause the pain and she denies falling on it and she states that she is not very activeThe patient does not have stairs at her house so she doesn't often use stairs and she states that her knee has not given out on her.  She states that she also woke up yesterday and noticed redness and swelling in her Left lower leg that is still going on today. She states that she has also noticed the right leg with redness and swelling today too.  She states that she initially went to her primary care where they did x-rays.   The patient has a past history of knee replacements in both knees and tried calling the doctor who did her knee replacement be he is no longer working.  The patient sates that her pain is a 5/10 today.   The patient states that she does see a vascular doctor and it was recommended that she schedules an appointment with that doctor.     Allergies  Zolpidem,  Other, Gabapentin, Magnesium, Magnesium carbonate, Metoclopramide hcl, Sertraline, Sulfa (sulfonamide antibiotics), and Adhesive tape-silicones    Review of Systems  CONSTITUTIONAL:   Negative for weight change, loss of appetite, fatigue, weakness, fever, chills, night sweats, headaches .           HEENT:   Negative for cold, cough, sore throat, sinus pain, swollen lymph nodes.           OPHTHALMOLOGY:   Negative for diminished vision, blurred vision, loss of vision, double vision.           ALLERGY:   Negative for runny nose, scratchy throat, sinus congestion, rash, facial pressure, nasal congestion, post-nasal drip.           CARDIOLOGY:   Negative for chest pain, palpitations, murmurs, irregular heart beat, shortness of breath, leg edema, dyspnea on exertion, fatigue, dizziness.           RESPIRATORY:   Negative for chest pain, shortness of breath, swelling of the legs, asthma/copd, chest congestion, pain with breathing .           GASTROENTEROLOGY:   Negative for nausea, vomitting, heartburn, constipation, diarrhea, blood in stool, change in bowel habits, black stool.           HEMATOLOGY/LYMPH:   Negative for fatigue, loss of appetitie, easy bruising, easy bleeding, anemia, abnormal bleeding, slow healing.           ENDOCRINOLOGY:   Negative for polyuria, polydipsia, polyphagia, fatigue, weight loss, weight gain, cold intolerance, heat intolerance, diabetes.           MUSCULOSKELETAL:   Positive  for Left knee pain        DERMATOLOGY:   Negative for rash, bruising.           NEUROLOGY:   Negative for tingling, numbness, gait abnormality, paresthesias, weakness, sciatica.        Examination: All findings relatively unchanged since previous visit  Location: LEFT Knee medial and lateral joint line.  BILATERAL Lower Leg.  Edema: Positive  Diffusely. Bilateral pitting edema in lower leg (+2)  Effusion: Negative.   Percussion Test:  Negative.   Tuning Fork Test:  Negative.   Ecchymosis/Bruising: Negative.    Observation: Positive bilateral leatherly and shiny appearance of lower legs as well as painful red and darkened skin that is very taunt.             Palpation: All findings relatively unchanged since previous visit  Positive  Left medial and lateral joint line pain as well as distal quadricep    Orientation: Positive  Asymmetrical: because of the swelling. All findings relatively unchanged since previous visit    Range of Motion:  All findings relatively unchanged since previous visit  Positive Knee Flexion ( 0-135 degrees) Decreased because of pain and swelling  Positive Knee Extension ( 0-15 degrees) Decreased because of pain and swelling           Muscle Strength:  All findings relatively unchanged since previous visit  Positive +4/+5 Quadricep Extension Causes pain  Positive +4/+5 Hamstring Flexion Causes pain  +5+5 Gastrocnemius  +5+5 Soleus.            Proprioception: All findings relatively unchanged since previous visit  Pain with Squat: Positive    Pain with Sumo Squat:  Positive   Hop Test: Positive    Single leg balance test Positive            Vascular:   +2/+4 Femoral  +2/+4 Dorsalis Pedis  +2/+4 Posterior Tibial  Capillary Refill less than 2 seconds.           KNEE - MCL / LCL:All findings relatively unchanged since previous visit  Valgus Stress Test: 90 degrees:Negative. 20-30 degrees: Negative.   Varus Stress Test:  90 degrees: Negative, 20-30 degrees: Negative.   Apley Distraction Test:Negative.   Thessaly Test: Positive unable to perform because of pain            KNEE - PATELLA:All findings relatively unchanged since previous visit  Apprehension Test:  Positive   Glide Test:  Positive   Grind Test: Positive   Patella Tracking Test: Positive              KNEE - QUADRICEPS:    All findings relatively unchanged since previous visit     VMO Test: Positive   VLO Test:  Negative    Hip/Pelvis - Sacrum:  Leg Length Supine: Positive Will verify at future office visit due to pain and limited ROM   Leg  Length Supine to Seated (Derbolowsky Sign): Positive Will verify at future office visit due to pain and limited ROM    Hip Flexor Tightness: Positive BILATERAL  Hamstring Tightness: Positive BILATERAL          Feet/Foot: Positive BILATERAL Valgus foot      Lower Leg:  Sejal's Sign: Positive B/L    Imaging and Diagnostics Review:  CT KNEE LEFT WO IV CONTRAST; ;  3/3/2025 10:45 am      INDICATION:  Signs/Symptoms:Left Knee pain status post knee replacement, check for  loosening..      ,M25.562 Pain in left knee,G89.29 Other chronic pain,Z96.652 Presence  of left artificial knee joint,R60.0 Localized edema      COMPARISON:  X-ray 02/12/2025      ACCESSION NUMBER(S):  [id number]      ORDERING CLINICIAN:  IVON BARRAZA      TECHNIQUE:  Serial axial CT images obtained of the left knee. Images reformatted  in the coronal and sagittal projection. Limiting characterization  given metallic streak artifact.      FINDINGS:  Femoral component of the total knee arthroplasty demonstrates no  sheyla-implant lucency within limits of this CT. No discrete fracture  demonstrated.      Tibial component of the knee arthroplasty demonstrates no  sheyla-implant lucency or fracture. Patella demonstrates no evidence  for fracture.      Extensor mechanism is intact. There is small knee joint effusion.  Musculature about the knee demonstrates no asymmetric atrophy.  Subcutaneous tissues demonstrate incision scar within the  infrapatellar location.          IMPRESSION CT of the left knee March 3, 2025:  1. Unremarkable visualized total knee arthroplasty. No lucency in  particular about the femoral component within limits of the CT.   2.  small knee joint effusion      Signed by: Mckenna Goel 3/3/2025 11:05 AM      XR knee left 3 views  Order date: 2/12/2025  Authorizing: JAVIER Armijo  Ordered by JAVIER Armijo on 2/12/2025.     Narrative & Impression    Interpreted By:  Schoenberger, Joseph,   STUDY:  XR KNEE LEFT 3  VIEWS; ;  2/12/2025 8:42 am      INDICATION:  Signs/Symptoms:knee pain.      ,M25.562 Pain in left knee      COMPARISON:  04/02/2023      ACCESSION NUMBER(S):  [id number]      ORDERING CLINICIAN:  CODI PEREZ      FINDINGS:  Lucency is developing superior to the medial aspect of the femoral  component of the left knee arthroplasty. This may be due to loosening  or stress shielding.. No acute fracture is noted.      IMPRESSION:  Developing lucency at the metal bone interface at the medial aspect  of the femoral component of the total knee arthroplasty. This may be due to loosening  or stress shielding. See discussion above.      Signed by: Joseph Schoenberger 2/13/2025 9:09 AM  Dictation workstation:   [id number]       Assessment   1. Effusion of knee joint, left        2. Chronic pain of left knee  Referral to Physical Therapy      3. History of total knee replacement, left  Referral to Physical Therapy      4. Lower leg edema  Referral to Physical Therapy      5. Chronic venous hypertension (idiopathic) with inflammation of bilateral lower extremity        6. Lipedema  Referral to Physical Therapy      7. Lymphedema        8. Pain and swelling of left lower leg  Referral to Physical Therapy      9. Pain and swelling of right lower leg        10. Varicose veins of both lower extremities with inflammation        11. Venous insufficiency        12. 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 Bilateral cellulitis of lower leg        14. History of stroke        15. Foot drop, left        16. Patellofemoral pain syndrome of left knee  Referral to Physical Therapy      17. Foot drop, left foot        18. Valgus foot deformity, acquired, unspecified laterality              Treatment or Intervention:  Continue to alternate ice and moist heat as needed  ,   Start into aquatic/physical Therapy 1-2 times a week for 8-10 weeks with manual therapy as well as dry needling and IASTM  ,    Stressed the importance of wearing shoes with good stability control to help with the biomechanics affecting the knees as well as the lower extremities  ,   Stressed the importance of wearing full foot insoles to help with the biomechanics affecting the knees as well as the lower extremities,   Once again recommendation over-the-counter calcium with vitamin-D 2 -3000+ milligrams a day, as well as a daily multivitamin.  ,   Once again recommendation over-the-counter curcumin, turmeric, boswellia, as well as egg shell membrane as directed to aid with joint inflammation.  ,   Once again recommendation over-the-counter move Free to help for joint health  Patient advised regarding the risks and/or potential adverse reactions and/or side effects of any prescribed medications along with any over-the-counter medications or any supplements used. Patient advised to seek immediate medical care if any adverse reactions occur. The patient and/or patient(s) parent(s) verbalized their understanding,   Discussed in detail with the patient to the level of their understanding the possibility in the future of regenerative injections viscosupplementation injections versus a combination  Possibility of future referral back to orthopedics for revision of previous knee replacement  Continue follow-ups with Dr. Mcmanus for  lymphedema, lipedema, and venous insufficiency as well as chronic venous hypertension affecting lower extremities   REFERRAL TO ORTHOPEDIC & PROSTHETICS FOR CUSTOM ORTHOTICS AND AFO BRACING LEFT lower leg for foot drop.  Follow-up MAY    Please note that this report has been produced using speech recognition software.  It may contain errors related to grammar, punctuation or spelling.  Electronically signed, but not reviewed.  DON Yoon, Director of Sports Medicine   IVON BARRAZA on 3/13/25 at 5:27 PM.     ABIEL Yoon DO

## 2025-03-11 ENCOUNTER — APPOINTMENT (OUTPATIENT)
Dept: VASCULAR MEDICINE | Facility: CLINIC | Age: 75
End: 2025-03-11
Payer: MEDICARE

## 2025-03-12 ENCOUNTER — OFFICE VISIT (OUTPATIENT)
Dept: SPORTS MEDICINE | Facility: CLINIC | Age: 75
End: 2025-03-12
Payer: MEDICARE

## 2025-03-12 VITALS
DIASTOLIC BLOOD PRESSURE: 78 MMHG | BODY MASS INDEX: 54.38 KG/M2 | HEART RATE: 66 BPM | SYSTOLIC BLOOD PRESSURE: 120 MMHG | WEIGHT: 277 LBS | HEIGHT: 60 IN

## 2025-03-12 DIAGNOSIS — Z96.652 HISTORY OF TOTAL KNEE REPLACEMENT, LEFT: ICD-10-CM

## 2025-03-12 DIAGNOSIS — R60.9 LIPEDEMA: ICD-10-CM

## 2025-03-12 DIAGNOSIS — R60.0 LOWER LEG EDEMA: ICD-10-CM

## 2025-03-12 DIAGNOSIS — I83.11 VARICOSE VEINS OF BOTH LOWER EXTREMITIES WITH INFLAMMATION: ICD-10-CM

## 2025-03-12 DIAGNOSIS — M79.89 PAIN AND SWELLING OF RIGHT LOWER LEG: ICD-10-CM

## 2025-03-12 DIAGNOSIS — L03.116 BILATERAL CELLULITIS OF LOWER LEG: ICD-10-CM

## 2025-03-12 DIAGNOSIS — I87.2 VENOUS INSUFFICIENCY: ICD-10-CM

## 2025-03-12 DIAGNOSIS — M79.89 PAIN AND SWELLING OF LEFT LOWER LEG: ICD-10-CM

## 2025-03-12 DIAGNOSIS — G89.29 CHRONIC PAIN OF LEFT KNEE: ICD-10-CM

## 2025-03-12 DIAGNOSIS — I87.323 CHRONIC VENOUS HYPERTENSION (IDIOPATHIC) WITH INFLAMMATION OF BILATERAL LOWER EXTREMITY: ICD-10-CM

## 2025-03-12 DIAGNOSIS — L03.115 BILATERAL CELLULITIS OF LOWER LEG: ICD-10-CM

## 2025-03-12 DIAGNOSIS — M79.661 PAIN AND SWELLING OF RIGHT LOWER LEG: ICD-10-CM

## 2025-03-12 DIAGNOSIS — M21.372 FOOT DROP, LEFT FOOT: ICD-10-CM

## 2025-03-12 DIAGNOSIS — M25.462 EFFUSION OF KNEE JOINT, LEFT: Primary | ICD-10-CM

## 2025-03-12 DIAGNOSIS — M25.562 CHRONIC PAIN OF LEFT KNEE: ICD-10-CM

## 2025-03-12 DIAGNOSIS — M22.2X2 PATELLOFEMORAL PAIN SYNDROME OF LEFT KNEE: ICD-10-CM

## 2025-03-12 DIAGNOSIS — Z86.73 HISTORY OF STROKE: ICD-10-CM

## 2025-03-12 DIAGNOSIS — I83.12 VARICOSE VEINS OF BOTH LOWER EXTREMITIES WITH INFLAMMATION: ICD-10-CM

## 2025-03-12 DIAGNOSIS — T84.093A FAILED TOTAL LEFT KNEE REPLACEMENT, INITIAL ENCOUNTER (CMS-HCC): ICD-10-CM

## 2025-03-12 DIAGNOSIS — M21.372 FOOT DROP, LEFT: ICD-10-CM

## 2025-03-12 DIAGNOSIS — I89.0 LYMPHEDEMA: ICD-10-CM

## 2025-03-12 DIAGNOSIS — M79.662 PAIN AND SWELLING OF LEFT LOWER LEG: ICD-10-CM

## 2025-03-12 DIAGNOSIS — M21.079 VALGUS FOOT DEFORMITY, ACQUIRED, UNSPECIFIED LATERALITY: ICD-10-CM

## 2025-03-12 PROCEDURE — 1159F MED LIST DOCD IN RCRD: CPT | Performed by: FAMILY MEDICINE

## 2025-03-12 PROCEDURE — 1125F AMNT PAIN NOTED PAIN PRSNT: CPT | Performed by: FAMILY MEDICINE

## 2025-03-12 PROCEDURE — 1123F ACP DISCUSS/DSCN MKR DOCD: CPT | Performed by: FAMILY MEDICINE

## 2025-03-12 PROCEDURE — 99214 OFFICE O/P EST MOD 30 MIN: CPT | Performed by: FAMILY MEDICINE

## 2025-03-12 PROCEDURE — 1157F ADVNC CARE PLAN IN RCRD: CPT | Performed by: FAMILY MEDICINE

## 2025-03-12 ASSESSMENT — PAIN - FUNCTIONAL ASSESSMENT: PAIN_FUNCTIONAL_ASSESSMENT: 0-10

## 2025-03-12 ASSESSMENT — ENCOUNTER SYMPTOMS
LOSS OF SENSATION IN FEET: 0
OCCASIONAL FEELINGS OF UNSTEADINESS: 0
DEPRESSION: 0

## 2025-03-12 ASSESSMENT — PAIN SCALES - GENERAL
PAINLEVEL_OUTOF10: 6
PAINLEVEL_OUTOF10: 6

## 2025-03-12 ASSESSMENT — PAIN DESCRIPTION - DESCRIPTORS: DESCRIPTORS: ACHING;SORE

## 2025-03-12 NOTE — PATIENT INSTRUCTIONS
Continue to alternate ice and moist heat as needed  ,   Start into aquatic/physical Therapy 1-2 times a week for 8-10 weeks with manual therapy as well as dry needling and IASTM  ,   Stressed the importance of wearing shoes with good stability control to help with the biomechanics affecting the knees as well as the lower extremities  ,   Stressed the importance of wearing full foot insoles to help with the biomechanics affecting the knees as well as the lower extremities,   Recommendation over-the-counter calcium with vitamin-D 2 -3000+ milligrams a day, as well as a daily multivitamin.  ,   Recommendation over-the-counter curcumin, turmeric, boswellia, as well as egg shell membrane as directed to aid with joint inflammation.  ,   Recommendation over-the-counter move Free to help for joint health  Patient advised regarding the risks and/or potential adverse reactions and/or side effects of any prescribed medications along with any over-the-counter medications or any supplements used. Patient advised to seek immediate medical care if any adverse reactions occur. The patient and/or patient(s) parent(s) verbalized their understanding,   Discussed in detail with the patient to the level of their understanding the possibility in the future of regenerative injections versus corticosteroid injections versus viscosupplementation injections versus a combination  CT scan with and without contrast of the left knee to further evaluate for previous hardware loosening from knee replacement  Possibility of future referral back to orthopedics for revision of previous knee replacement  Immediate Referral to Dr. Mcmanus for Stat Ultrasound bilateral lower extremities for DVT as well as consult for lymphedema, lipedema, and venous insufficiency as well as chronic venous hypertension affecting lower extremities she will go straight from our office over to Dr. Mcmanus's  Additionally Dr. Mcmanus will put her on antibiotic for cellulitis  after she sees her  REFERRAL TO ORTHOPEDIC & PROSTHETICS FOR AFO BRACING LEFT FOOT.  Follow-up MAY

## 2025-03-13 PROBLEM — M22.2X2 PATELLOFEMORAL PAIN SYNDROME OF LEFT KNEE: Status: ACTIVE | Noted: 2025-03-13

## 2025-03-13 PROBLEM — M21.372 FOOT DROP, LEFT: Status: ACTIVE | Noted: 2025-03-13

## 2025-03-13 PROBLEM — M25.462 EFFUSION OF KNEE JOINT, LEFT: Status: ACTIVE | Noted: 2025-03-13

## 2025-03-13 PROBLEM — M21.079 VALGUS FOOT DEFORMITY, ACQUIRED, UNSPECIFIED LATERALITY: Status: ACTIVE | Noted: 2025-03-13

## 2025-03-13 PROBLEM — M21.372 FOOT DROP, LEFT FOOT: Status: ACTIVE | Noted: 2025-03-13

## 2025-03-13 PROBLEM — Z86.73 HISTORY OF STROKE: Status: ACTIVE | Noted: 2025-03-13

## 2025-03-14 ENCOUNTER — APPOINTMENT (OUTPATIENT)
Dept: PHYSICAL THERAPY | Facility: CLINIC | Age: 75
End: 2025-03-14
Payer: MEDICARE

## 2025-03-17 ENCOUNTER — SPECIALTY PHARMACY (OUTPATIENT)
Dept: PHARMACY | Facility: CLINIC | Age: 75
End: 2025-03-17

## 2025-03-18 ENCOUNTER — TELEPHONE (OUTPATIENT)
Dept: NEUROLOGY | Facility: CLINIC | Age: 75
End: 2025-03-18
Payer: MEDICARE

## 2025-03-18 ENCOUNTER — TELEPHONE (OUTPATIENT)
Dept: SPORTS MEDICINE | Facility: CLINIC | Age: 75
End: 2025-03-18
Payer: MEDICARE

## 2025-03-18 ENCOUNTER — APPOINTMENT (OUTPATIENT)
Dept: VASCULAR MEDICINE | Facility: CLINIC | Age: 75
End: 2025-03-18
Payer: MEDICARE

## 2025-03-18 DIAGNOSIS — G40.909 SEIZURE DISORDER (MULTI): ICD-10-CM

## 2025-03-18 DIAGNOSIS — E87.6 HYPOKALEMIA: ICD-10-CM

## 2025-03-18 PROCEDURE — RXMED WILLOW AMBULATORY MEDICATION CHARGE

## 2025-03-18 RX ORDER — POTASSIUM CHLORIDE 750 MG/1
10 TABLET, FILM COATED, EXTENDED RELEASE ORAL 3 TIMES DAILY
Qty: 90 TABLET | Refills: 5 | Status: SHIPPED | OUTPATIENT
Start: 2025-03-18

## 2025-03-18 NOTE — TELEPHONE ENCOUNTER
Patient called stating she would like to talk to either  or any one in the back in reference to the boot Dr love wants made?   Could someone please call and talk to patient to address her concerns   Thank you

## 2025-03-18 NOTE — TELEPHONE ENCOUNTER
Khadra was confused about what to do with Lamictal after starter pack as  pharmacist did not think she should refill the starter pack (which is true)  She finished the starter pack at 100 mg about 3 weeks ago.   Did not notice improvement in drop seizures.  Dizziness was a bit worse.   Do we need to repeat the starter Tad and send a script for the 100 mg daily to continue on after starter?  Or , can she just start 100 mg after 3 week break from med?

## 2025-03-18 NOTE — TELEPHONE ENCOUNTER
Should have copies of the scripts that I believe we sent over to [facility] and we can print those out so she can give it to her insurance.  Thank you

## 2025-03-19 ENCOUNTER — PHARMACY VISIT (OUTPATIENT)
Dept: PHARMACY | Facility: CLINIC | Age: 75
End: 2025-03-19
Payer: COMMERCIAL

## 2025-03-19 DIAGNOSIS — G40.909 SEIZURE DISORDER (MULTI): Primary | ICD-10-CM

## 2025-03-20 ENCOUNTER — HOSPITAL ENCOUNTER (OUTPATIENT)
Dept: VASCULAR MEDICINE | Facility: CLINIC | Age: 75
Discharge: HOME | End: 2025-03-20
Payer: MEDICARE

## 2025-03-20 DIAGNOSIS — R60.0 LOWER LEG EDEMA: ICD-10-CM

## 2025-03-20 PROCEDURE — 93970 EXTREMITY STUDY: CPT

## 2025-03-20 PROCEDURE — 93970 EXTREMITY STUDY: CPT | Performed by: INTERNAL MEDICINE

## 2025-03-20 RX ORDER — LAMOTRIGINE 50 MG/1
50 TABLET, EXTENDED RELEASE ORAL DAILY
Qty: 7 TABLET | Refills: 0 | Status: SHIPPED | OUTPATIENT
Start: 2025-03-20

## 2025-03-20 RX ORDER — LAMOTRIGINE 100 MG/1
100 TABLET, EXTENDED RELEASE ORAL DAILY
Qty: 30 TABLET | Refills: 3 | Status: SHIPPED | OUTPATIENT
Start: 2025-03-20

## 2025-03-20 NOTE — TELEPHONE ENCOUNTER
Entered scripts for signature.   Theodore Khadra on Lamotrigine 50mg times 7 days then 100 mg lamotrigine.   Scheduled visit 5-2-2025 at [facility] with Dr. York.   Khadra states understanding of all instruction

## 2025-03-25 RX ORDER — LAMOTRIGINE 100 MG/1
100 TABLET ORAL EVERY MORNING
Qty: 30 TABLET | Refills: 2 | Status: SHIPPED | OUTPATIENT
Start: 2025-03-25 | End: 2026-03-25

## 2025-03-26 NOTE — PROGRESS NOTES
Established patient  History Of Present Illness  Khadra Earl is a 74 y.o. female presenting with CERVICAL and LEFT shoulder pain. Her neck is very sore with limited ROM. She feels that she hears a click when she moves her neck. This has been bothering her for the past 6 months. She does not recall a specific injury that caused this pain, but she does state she falls out of bed often. The pain is located in the middle and left posterior aspect of her neck. The pain is constant but worse with movement. It does wake her up at night. The pain does not radiate down her arm. She denies numbness or tingling. She has tried ice packs and heat with no relief.     Her shoulder has been bothering her for about 6 months. It mostly hurts with motion and usually does not bother her at rest. She describes it as a sharp pain located on the superior aspect of her shoulder and travels down her lateral shoulder. It radiates down to her elbow. She denies numbness and tingling. She feels her ROM and strength is worsening. She has tried ice and biofreeze with no relief.    She does have history of a stroke that affected her left side.  We discussed treatment options.  Upon exam patient has significant restriction of her cervical spine as well as significant tenderness of the musculature.  Patient has a previous history of cervical fusion and denies any current trauma so we will treat patient with conservative therapy with physical therapy and anti-inflammatory therapy.  Patient's shoulder has indications of a rotator cuff strain as well as a shoulder sprain with likely exacerbation of her degenerative joint disease.  Patient denies any trauma related to her shoulder as well so this is likely an exacerbation of her DJD with a rotator cuff strain/shoulder sprain.  Patient can start into physical therapy for her shoulder as well.  We can reevaluate in 6 weeks and adjust treatment as indicated at that time.  We can follow-up sooner as  needed.  Patient verbalizes understanding and agreement with plan of care.    Past Medical History  She has a past medical history of Acute bronchitis, unspecified (04/18/2022), Bariatric surgery status (10/28/2020), Other chest pain (04/18/2022), Other conditions influencing health status (04/18/2022), Personal history of malignant melanoma of skin, Personal history of other specified conditions (06/07/2021), and Personal history of other specified conditions (03/18/2022).    Surgical History  She has a past surgical history that includes Tonsillectomy (04/10/2014); Appendectomy (04/10/2014); Hemicolectomy (04/10/2014); Dilation and curettage of uterus (04/10/2014); Cholecystectomy (04/10/2014); Small intestine surgery (04/10/2014); Total knee arthroplasty (04/10/2014); Other surgical history (04/10/2014); Other surgical history (04/10/2014); Breast lumpectomy (04/10/2014); Lymph node biopsy (04/10/2014); Hernia repair (04/10/2014); Other surgical history (08/10/2021); Other surgical history (02/16/2022); Adenoidectomy (02/01/2018); CT guided imaging for needle placement (06/11/2012); and Breast biopsy (Bilateral).     Social History  She reports that she has never smoked. She has never been exposed to tobacco smoke. She has never used smokeless tobacco. She reports that she does not drink alcohol and does not use drugs.    Family History  Family History   Problem Relation Name Age of Onset    Uterine cancer Mother      Ovarian cancer Mother      Alzheimer's disease Mother      Anxiety disorder Father      Skin cancer Father      Alzheimer's disease Father      Breast cancer Sister  35    Alzheimer's disease Sister      Ovarian cancer Maternal Grandmother       Allergies  Zolpidem, Other, Gabapentin, Magnesium, Magnesium carbonate, Metoclopramide hcl, Sertraline, Sulfa (sulfonamide antibiotics), and Adhesive tape-silicones    Review of Systems  Review of Systems   Constitutional: Negative.    Respiratory: Negative.      Cardiovascular: Negative.    Musculoskeletal:  Positive for arthralgias and myalgias.   All other systems reviewed and are negative.       Last Recorded Vitals  /78 (BP Location: Right arm, Patient Position: Sitting, BP Cuff Size: Large adult)   Pulse 70   Ht 1.524 m (5')   Wt 126 kg (277 lb)   BMI 54.10 kg/m²      The , REESE GORAN was present during today's visit and not limited to physical examination!    Examination:  CERVICAL     TART Findings: Positive Tissue Texture Changes, Asymmetry, Restriction, Tenderness.   Ecchymosis/Bruising: Negative.   Percussion Test (CERVICAL): Negative.   Tuning Fork Test (CERVICAL): Negative.   Orientation (CERVICAL): Positive decreased lordosis    ROM (CERVICAL):  Positive painful ROM, Forward Flexion, Extension, and Lateral Bending (Side Bending).     Muscle Strength:   Positive  decreased in all fields Cervical Flexion, Extension, Left Rotation, Right Rotation, Left Lateral Flexion, Right Lateral Flexion, Trapezius (Shrug), Anterior Deltoid, Posterior Deltoid and Lateral Deltoid.          DTR/Neurological: Symmetrical  +2/+4 Biceps Reflex (C5)  +2/+4 Brachioradialis Reflex (C6)  +2/+4 Triceps Reflex (C7).     Sensation/Neurological Cervical:   Negative, Symmetrical:  C2: Lower jaw and back of head  C3: Upper neck and back of head  C4: Lower neck, upper shoulders, and upper chest  C5: Area of collarbones, lateral upper arms, and upper chest  C6: Lateral forearms, thumbs, anterior index finger, and lateral half of the middle finger  C7: Some of posterior index finger, medial half of middle finger, upper posterior back, and back of arms  C8: Ring finger, little finger, medial forearm, and posterior upper back.     Cervicle Spine Tests:  Lhermitte's Sign: Negative.   Spurling's Test ([place]-Axial Compression Test): Negative.   Reverse Spurling's Test: Negative.   Cervical Compression with Max Foraminal Compression Test: Negative.   Intervertebral  Foramina Compression Test: Negative.   Flexion Compression Test:Positive  Extension Compression Test: Positive  Maximum Compression of the Intervertebral Foramina Test: Negative.   Forward Flexion Test: Positive    ---------------------------------------------  Examination:  Left Shoulder   Edema: Negative.   Ecchymosis/Bruising: Negative.   Percussion Test: Negative.   Tuning Fork Test: Negative.   Orientation: Symmetrical.            Winging Scapula:   Positive  BILATERAL.     ROM: Positive   Positive Causes pain  Forward Flexion (0-180 degrees) Causes pain  Extension (0-60 degrees)  ABduction (0-180 degrees) Causes pain (120 degrees)   ADduction (30-50 degrees)  External Rotation with elbow at side (0-90 degrees)  Internal Rotation with elbow at side (0-70 degrees)  Horizontal ABduction (0-90 degrees) Causes pain  Horizontal ADduction (0-45 degrees) Causes pain  External Rotation with elbow at 90 degrees of ABduction [0-() degrees] Causes pain  Internal Rotation with elbow at 90 degrees of ABduction [0-(70-90) degrees] Causes pain  Apley's Shoulder Scratch Test Superiorly Tests a Combination of: Flexion, External Rotation, and Scapular ABduction - Decreased due to pain   Apley's Shoulder Scratch Test Inferiorly Tests a Combination of: Extension, Internal Rotation, and Scapular ADduction - Decreased due to pain     Muscle Strength: Positive   +4/+5:Internal Rotation - subscapularis  +4/+5: External Rotation - infraspinatus and teres minor  +4/+5:ABduction - supraspinatus and deltoid  +3/+5: ABduction with thumbs down and 30 degrees horizontal ADduction - supraspinatus  +4/+5: Palms up with elbow bent to 15 degrees flexion and resisted upward motion - biceps  +4/+5: Simultaneous resisted supination and elbow flexion- biceps  +5/+5:Flexion  +5/+5:Extension  +4/+5:ABduction  +4/+5:ADduction  +4/+5:Internal Rotation at 90 Degrees  +4/+5:External Rotation at 90 Degrees  +5/+5:Internal Rotation at 0  "Degrees  +5/+5:External Rotation at 0 Degrees  +4/+5:Apley's Shoulder Scratch Test Superiorly Tests a Combination of: Flexion, External Rotation, and Scapular ABduction  +4/+5:Apley's Shoulder Scratch Test Inferiorly Tests a Combination of: Extension, Internal Rotation, and Scapular ADduction  +5/+5:Triceps  +5/+5: Biceps            Vascular:   Capillary Refillless than 2 seconds , Negative, Symmetrical:  +2/+4: Carotid pulse  +2/+4: Radial pulse  +2/+4: Ulnar pulse  +2/+4: Brachial pulse     Palpation:   Positive  Tenderness to Palpation Rotator Cuff interval, glenohumeral joint space, and biceps tendon.                          Shoulder - Subscapularis:  Bear Hug Test:  Negative.  Lift Off Test:  Negative.  [place] Test:  Negative.  Resisted Elbow Push Down Test:  Negative.  Resisted Lift Off \"\" Test:  Negative.    Shoulder - Supraspinatus:  Full Can Test:  Negative.   Empty Can Test:  Positive     Resisted Elbow Push Up Test:  Negative.  Drop Arm Test:  Negative.    Shoulder - Infraspinatus:  Resisted ER at 0 degrees ABduction:  Positive  with arm at side.      Shoulder - Teres Minor:  Resisted ER at 90 degrees ABduction:  Positive        Shoulder - Biceps:  Speeds Test: Negative.  Yergason Test: Negative.    Shoulder - Labrum/Instability:  Anterior Release/Surprise Test: Positive     Bicep Flexion at 90 degrees ABduction Test: Positive     Posterior Apprehension Test: Positive     Posterior Release/Surprise Test: Positive    Clunk Test:  Positive     Grind Test:  Positive     [place] Test:  Positive  Thumbs Down, Positive  Thumbs Up.   Crank Test:  Positive     Horizontal Adduction Thumb Down: Positive        Shoulder - AC Joint:  Painful Arc 120-180 degrees:  Positive .   AC Compression Test: Positive .   Cross Body ADduction Stress Test: Negative.  Piano Key Sign: Negative.  AC Distraction Test:  Negative.          Imaging and Diagnostics Review:  Plain radiographs were independently reviewed.  No " acute fractures or dislocations noted however patient does have degenerative joint disease of the cervical spine with fusion hardware in place as well as degenerative joint disease of the shoulder.  Assessment   1. Acute pain of left shoulder    2. Rotator cuff strain, left, initial encounter    3. Rotator cuff impingement syndrome of left shoulder    4. Strain of left supraspinatus muscle or tendon    5. Biceps tendinitis of left shoulder    6. Shoulder instability, left    7. Left cervical radiculopathy    8. H/O cervical discectomy    9. Cervical spondylosis    10. DDD (degenerative disc disease), cervical        Plan   Treatment or Intervention:  May use RICE therapy as needed  Start into physical therapy 1-2 times a week for 8-10 weeks with manual therapy as well as dry needling and IASTM  Additionally reviewed modifying activities to be performed while exercising as well as activities with daily living  Discussed in detail with the patient to the level of their understanding the possibility in the future of regenerative injections versus corticosteroid injections  Recommendation over-the-counter calcium 500mg, 3 times a day with vitamin-D3 7838-6878+ units a day with food as well as a daily multivitamin  Recommendation over-the-counter Move Free for joint health  May take OTC Tylenol Extra Strength or OTC Tylenol Arthritis, taking one every 6-8 hours with food as needed for pain management.   Patient advised regarding the risks and/or potential adverse reactions and/or side effects of any prescribed medications along with any over-the-counter medications or any supplements used. Patient advised to seek immediate medical care if any adverse reactions occur. The patient and/or patient(s) parent(s) verbalized their understanding.  Possibility in future of MRI/MR Arthrogram of LEFT SHOULDER to rule out tendon vs ligament vs labral tear vs fracture vs other. MSK to read  Possibility in future of MRI of CERVICAL  SPINE to rule out disc herniation vs fracture vs other. MSK to read     Diagnostic studies:  Interpreted By:  Dianne Penn,   STUDY: Cervical spine, 4 views.      INDICATION: Signs/Symptoms:right cervical pain.      ORDERING CLINICIAN: JORDY CUADRA      FINDINGS:  Alignment is within normal limits.  Mild spondylosis at C3-4 and C6-7 with disc height loss and anterior  osteophytes. Anterior cervical discectomy and fusion changes are  noted with hardware extending from C4-C6 with corpectomy changes at  C5 and C6. Hardware is intact without perihardware fractures or  lucencies. Vertebral body heights are preserved. Posterior elements  are intact. No dynamic instability on flexion extension views.  Prevertebral soft tissues are unremarkable.      IMPRESSION:  1. Anterior cervical discectomy and fusion C4-C6 without hardware  complication.  2. Mild spondylosis at C3-4 and C6-7.      MACRO:  None.      Signed by: Dianne Penn 11/7/2023 6:01 PM  Dictation workstation:   [id number]    Activity Instructions, Restrictions, and Accommodations:      Consultations/Referrals:  Physical therapy    Follow-up:  Follow up 6-8 weeks  Total appointment time _30_ minutes. Greater than 50% spent counseling patient on results of physical exam, treatment options as well as results of ordered imaging and treatment for results, need for PT and expected outcomes, as well as discussing possible medications.    López Reynoso CNP

## 2025-03-27 ENCOUNTER — HOSPITAL ENCOUNTER (OUTPATIENT)
Dept: RADIOLOGY | Facility: CLINIC | Age: 75
Discharge: HOME | End: 2025-03-27
Payer: MEDICARE

## 2025-03-27 ENCOUNTER — OFFICE VISIT (OUTPATIENT)
Dept: SPORTS MEDICINE | Facility: CLINIC | Age: 75
End: 2025-03-27
Payer: MEDICARE

## 2025-03-27 VITALS
SYSTOLIC BLOOD PRESSURE: 120 MMHG | BODY MASS INDEX: 54.38 KG/M2 | HEART RATE: 70 BPM | DIASTOLIC BLOOD PRESSURE: 78 MMHG | WEIGHT: 277 LBS | HEIGHT: 60 IN

## 2025-03-27 DIAGNOSIS — M25.312 SHOULDER INSTABILITY, LEFT: ICD-10-CM

## 2025-03-27 DIAGNOSIS — M50.30 DDD (DEGENERATIVE DISC DISEASE), CERVICAL: ICD-10-CM

## 2025-03-27 DIAGNOSIS — M75.22 BICEPS TENDINITIS OF LEFT SHOULDER: ICD-10-CM

## 2025-03-27 DIAGNOSIS — S46.812A STRAIN OF LEFT SUPRASPINATUS MUSCLE OR TENDON: ICD-10-CM

## 2025-03-27 DIAGNOSIS — S46.012A ROTATOR CUFF STRAIN, LEFT, INITIAL ENCOUNTER: ICD-10-CM

## 2025-03-27 DIAGNOSIS — M25.512 ACUTE PAIN OF LEFT SHOULDER: ICD-10-CM

## 2025-03-27 DIAGNOSIS — M54.12 LEFT CERVICAL RADICULOPATHY: ICD-10-CM

## 2025-03-27 DIAGNOSIS — Z98.890 H/O CERVICAL DISCECTOMY: ICD-10-CM

## 2025-03-27 DIAGNOSIS — M75.42 ROTATOR CUFF IMPINGEMENT SYNDROME OF LEFT SHOULDER: ICD-10-CM

## 2025-03-27 DIAGNOSIS — M47.812 CERVICAL SPONDYLOSIS: ICD-10-CM

## 2025-03-27 PROCEDURE — 1159F MED LIST DOCD IN RCRD: CPT | Performed by: NURSE PRACTITIONER

## 2025-03-27 PROCEDURE — 1123F ACP DISCUSS/DSCN MKR DOCD: CPT | Performed by: NURSE PRACTITIONER

## 2025-03-27 PROCEDURE — 99214 OFFICE O/P EST MOD 30 MIN: CPT | Performed by: NURSE PRACTITIONER

## 2025-03-27 PROCEDURE — 1125F AMNT PAIN NOTED PAIN PRSNT: CPT | Performed by: NURSE PRACTITIONER

## 2025-03-27 PROCEDURE — 1157F ADVNC CARE PLAN IN RCRD: CPT | Performed by: NURSE PRACTITIONER

## 2025-03-27 PROCEDURE — 1036F TOBACCO NON-USER: CPT | Performed by: NURSE PRACTITIONER

## 2025-03-27 PROCEDURE — 73030 X-RAY EXAM OF SHOULDER: CPT | Mod: LT

## 2025-03-27 PROCEDURE — 72050 X-RAY EXAM NECK SPINE 4/5VWS: CPT

## 2025-03-27 RX ORDER — PREDNISONE 10 MG/1
TABLET ORAL
Qty: 30 TABLET | Refills: 0 | Status: SHIPPED | OUTPATIENT
Start: 2025-03-27

## 2025-03-27 ASSESSMENT — ENCOUNTER SYMPTOMS
MYALGIAS: 1
CARDIOVASCULAR NEGATIVE: 1
CONSTITUTIONAL NEGATIVE: 1
DEPRESSION: 0
ARTHRALGIAS: 1
LOSS OF SENSATION IN FEET: 0
RESPIRATORY NEGATIVE: 1
OCCASIONAL FEELINGS OF UNSTEADINESS: 0

## 2025-03-27 ASSESSMENT — PATIENT HEALTH QUESTIONNAIRE - PHQ9
1. LITTLE INTEREST OR PLEASURE IN DOING THINGS: NOT AT ALL
2. FEELING DOWN, DEPRESSED OR HOPELESS: NOT AT ALL
SUM OF ALL RESPONSES TO PHQ9 QUESTIONS 1 AND 2: 0

## 2025-03-27 ASSESSMENT — PAIN SCALES - GENERAL: PAINLEVEL_OUTOF10: 6

## 2025-03-27 NOTE — PATIENT INSTRUCTIONS
May use RICE therapy as needed  Start into physical therapy 1-2 times a week for 8-10 weeks with manual therapy as well as dry needling and IASTM  Additionally reviewed modifying activities to be performed while exercising as well as activities with daily living  Discussed in detail with the patient to the level of their understanding the possibility in the future of regenerative injections versus corticosteroid injections  Recommendation over-the-counter calcium 500mg, 3 times a day with vitamin-D3 5766-7703+ units a day with food as well as a daily multivitamin  Recommendation over-the-counter Move Free for joint health  May take OTC Tylenol Extra Strength or OTC Tylenol Arthritis, taking one every 6-8 hours with food as needed for pain management.   Patient advised regarding the risks and/or potential adverse reactions and/or side effects of any prescribed medications along with any over-the-counter medications or any supplements used. Patient advised to seek immediate medical care if any adverse reactions occur. The patient and/or patient(s) parent(s) verbalized their understanding.  Possibility in future of MRI/MR Arthrogram of LEFT SHOULDER to rule out tendon vs ligament vs labral tear vs fracture vs other. MSK to read  Possibility in future of MRI of CERVICAL SPINE to rule out disc herniation vs fracture vs other. MSK to read   Follow up 6-8 weeks

## 2025-03-28 NOTE — PROGRESS NOTES
Physical Therapy Evaluation    Patient Name: Khadra Earl  MRN: [id number]  Evaluation Date: 3/31/2025  Time Calculation  Start Time: 1420  Stop Time: 1500  Time Calculation (min): 40 min  PT Evaluation Time Entry  PT Evaluation (Low) Time Entry: 25     PT Therapeutic Procedures Time Entry  Therapeutic Exercise Time Entry: 12     Insurance Information: 2025: MEDICARE A/B, AARP, MEDICAID [id number]  - NO AUTH  Problem List Items Addressed This Visit             ICD-10-CM    Lower leg edema R60.0    Acute pain of left shoulder - Primary M25.512    Relevant Orders    Follow Up In Physical Therapy    Rotator cuff strain, left, initial encounter S46.012A    Rotator cuff impingement syndrome of left shoulder M75.42    Strain of left supraspinatus muscle or tendon S46.812A    Biceps tendinitis of left shoulder M75.22    Shoulder instability, left M25.312         Subjective   General:  Patient is a 75 yo female with diagnosis of acute pain left shoulder/cervical pain.  Patient reports 6 month history of neck pain with limited ROM.  Patient denies injury or trauma.  Patient denies radicular pain. Patient report 6 month history of non-traumatic shoulder pain.  Patient symptoms are worse with activity.  Patient is motivated to participate     Precautions:  History of cervical fusion  CANCER HISTORY  SEIZURE HISTORY  migraines    Relevant PMH:  Cervical fusion  CVA with left hemiparesis  Skin cancer  Seizure history  Bilateral TKA  Bilatearlal CTS      Pain:  left shoulder  At worst  9/10  Worse with  Overhead function  Sleeping on left side    At best  0/10  Better with  Rest   Avoiding overhead motions     Home Living:  Hobbies/activities: baking/quilting    Prior Function Per Pt/Caregiver Report:  Active as able    Imaging:  Xray left shoulder  FINDINGS:   Left shoulder, three views   There is no fracture. There is no dislocation. There are no   degenerative changes. There is no lytic or sclerotic lesion. There is   no soft  tissue abnormality seen.    Normal radiographs of the left shoulder        X-ray cervical spine  FINDINGS:   C-spine, four views   Anterior spinal fusion C4-C6 with intact hardware. There is moderate   disc space narrowing osteophytosis at C3-C4 and C6-C7. There is   prevertebral soft tissue prominence, similar to the prior. There is   no fracture or spondylolisthesis   Anterior fusion C4-C6 with intact hardware. Moderate multilevel   spondylosis in the remainder of the cervical spine. No acute   abnormality seen from the prior          Objective   Posture:  FHP     Range of Motion:  Shoulder AROM L R   Flexion 110 deg 140 deg   Extension WFL deg WFL deg   Abduction 90 deg 130 deg   External Rotation 60 deg 70 deg   Internal Rotation T12 deg T7 deg      Cervical AROM Range   Flexion 25% limited-tightness   Extension 50% limited- fusion   R rotation 35 degrees- tightness   L rotation 45 degrees-tightness   R sidebend WFL   L sidebend WFL         Strength:  Shoulder MMT L R   Flexion 4-/5 4/5   Extension 4/5 4/5   Abduction 4-/5 4/5   External Rotation 4-/5 4/5   Internal Rotation 4/5 4/5     Left 38 psi  Right 40 psi    Flexibility:  Tightness anterior chest     Palpation:  ++tenderness left UT/left biceps tendon     Special Tests:  SS NEG  IS- questionable left    Outcome Measures:  SPADI  To completer next visit    Assessment   Pt is a 74 y.o. female who presents with impairments of left shoulder pain/limited ROM/weakness These impairments have led to functional limitations including difficulty with ADLs overhead. Pt would benefit from skilled physical therapy intervention to improve above impairments and facilitate return to function.    Complexity of Evaluation: Low    Based on the history including personal factors and/or comorbidities, examination of body systems including body structures and function, activity limitations, and/or participation restrictions, as well as clinical presentation, patient meets  criteria for above complexity evaluation.    Plan  1-2x/week  Up to 10 visits  Therapeutic exercise  Manual      Insurance Plan: Payor: MEDICARE / Plan: MEDICARE PART A AND B / Product Type: *No Product type* /     Plan for next visit:   Add manual to left shoulder  Advance therapeutic exercise/HEP as appropriate    OP EDUCATION:  HEP  Posture awareness    Today's Treatment:  Therapeutic Exercise  HEP to be completed daily, exercises include:  Table slide flexion  Table slide ABDuction  OTB rows  OTB EXT    Goals:  STG(3 visits)  Patient to be independent with HEP    LTG(10 visits)  SPADI to be <30 % impaired  Patient to perform ADLs with pain <2/10  AROM left shoulder = right  MMT left= to right

## 2025-03-31 ENCOUNTER — EVALUATION (OUTPATIENT)
Dept: PHYSICAL THERAPY | Facility: CLINIC | Age: 75
End: 2025-03-31
Payer: MEDICARE

## 2025-03-31 DIAGNOSIS — R60.0 LOWER LEG EDEMA: ICD-10-CM

## 2025-03-31 DIAGNOSIS — M75.22 BICEPS TENDINITIS OF LEFT SHOULDER: ICD-10-CM

## 2025-03-31 DIAGNOSIS — S46.012A ROTATOR CUFF STRAIN, LEFT, INITIAL ENCOUNTER: ICD-10-CM

## 2025-03-31 DIAGNOSIS — S46.812A STRAIN OF LEFT SUPRASPINATUS MUSCLE OR TENDON: ICD-10-CM

## 2025-03-31 DIAGNOSIS — M25.512 ACUTE PAIN OF LEFT SHOULDER: Primary | ICD-10-CM

## 2025-03-31 DIAGNOSIS — M75.42 ROTATOR CUFF IMPINGEMENT SYNDROME OF LEFT SHOULDER: ICD-10-CM

## 2025-03-31 DIAGNOSIS — M25.312 SHOULDER INSTABILITY, LEFT: ICD-10-CM

## 2025-03-31 PROCEDURE — 97161 PT EVAL LOW COMPLEX 20 MIN: CPT | Mod: GP

## 2025-03-31 PROCEDURE — 97110 THERAPEUTIC EXERCISES: CPT | Mod: GP

## 2025-04-02 ENCOUNTER — APPOINTMENT (OUTPATIENT)
Dept: PHYSICAL THERAPY | Facility: CLINIC | Age: 75
End: 2025-04-02
Payer: MEDICARE

## 2025-04-03 ENCOUNTER — TREATMENT (OUTPATIENT)
Dept: PHYSICAL THERAPY | Facility: CLINIC | Age: 75
End: 2025-04-03
Payer: MEDICARE

## 2025-04-03 DIAGNOSIS — M25.512 ACUTE PAIN OF LEFT SHOULDER: ICD-10-CM

## 2025-04-03 PROCEDURE — 97140 MANUAL THERAPY 1/> REGIONS: CPT | Mod: GP

## 2025-04-03 PROCEDURE — 97110 THERAPEUTIC EXERCISES: CPT | Mod: GP

## 2025-04-03 NOTE — PROGRESS NOTES
Physical Therapy Treatment    Patient Name: Khadra Earl  MRN: [id number]  Encounter date:  4/3/2025  Time Calculation  Start Time: 1245  Stop Time: 1325  Time Calculation (min): 40 min     PT Therapeutic Procedures Time Entry  Manual Therapy Time Entry: 10  Therapeutic Exercise Time Entry: 25       Visit Number:  2 (including evaluation)  Planned total visits: 10  Visits Authorized/Insurance Coverage:  2025: MEDICARE A/B, AARP, MEDICAID 30V  - NO AUTH  Plan of Care to transfer to [facility] after 4/18/2025    Current Problem  Problem List Items Addressed This Visit             ICD-10-CM    Acute pain of left shoulder M25.512       Precautions:  History of cervical fusion  CANCER HISTORY  SEIZURE HISTORY  migraines     Relevant PMH:  Cervical fusion  CVA with left hemiparesis  Skin cancer  Seizure history  Bilateral TKA  Bilatearlal CTS       Pain  Left shoulder  6-7/10       Subjective  General  Patient s/p seizure yesterday.   Patient fell - some right sided LBP.  Patient otherwise without complaints.  Patient to follow up with neurologist.  Patient compliant with HEP without issues.    Objective  good    Treatment:  Manual  STM bilateral   CPS/UT seated    Therapeutic Exercise  UBE L2 F/B x 2/2  Pulleys flexion 2'/scaption x 1'    OTB rows 2 x 10  OTB EXT 2 x 10    OTB IR 2 x 10  OTB ADD 2 x 10         Current HEP:  HEP to be completed daily, exercises include:  Table slide flexion  Table slide ABDuction  OTB rows  OTB EXT    OTB IR   OTB ER     Goals:  STG(3 visits)  Patient to be independent with HEP     LTG(10 visits)  SPADI to be <30 % impaired  Patient to perform ADLs with pain <2/10  AROM left shoulder = right  MMT left= to right    Has patient been compliant with HEP? Yes    Activity tolerance:  good    OP EDUCATION:  HEP  Postural awareness    Assessment:  Pt's response to treatment:  good  Areas of improvements:  exercise tolerance  Limitations/deficits:  shoulder pain    Pain end of session:    No changes    Plan:  Continue with current POC with the following changes advance as able    Assessment of current progress against goals:  Progressing toward functional goals       Goals:  STG(3 visits)  Patient to be independent with HEP     LTG(10 visits)  SPADI to be <30 % impaired  Patient to perform ADLs with pain <2/10  AROM left shoulder = right  MMT left= to right

## 2025-04-07 ENCOUNTER — TREATMENT (OUTPATIENT)
Dept: PHYSICAL THERAPY | Facility: CLINIC | Age: 75
End: 2025-04-07
Payer: MEDICARE

## 2025-04-07 DIAGNOSIS — M25.512 ACUTE PAIN OF LEFT SHOULDER: ICD-10-CM

## 2025-04-07 PROCEDURE — 97110 THERAPEUTIC EXERCISES: CPT | Mod: CQ,GP | Performed by: SPECIALIST/TECHNOLOGIST

## 2025-04-07 PROCEDURE — 97140 MANUAL THERAPY 1/> REGIONS: CPT | Mod: CQ,GP | Performed by: SPECIALIST/TECHNOLOGIST

## 2025-04-07 NOTE — PROGRESS NOTES
"    Physical Therapy Treatment    Patient Name: Khadra Earl  MRN: [id number]  Encounter date:  4/7/2025  Time Calculation  Start Time: 0850  Stop Time: 0929  Time Calculation (min): 39 min     PT Therapeutic Procedures Time Entry  Manual Therapy Time Entry: 10  Therapeutic Exercise Time Entry: 25       Visit Number:  3 (including evaluation)  Planned total visits: 10  Visits Authorized/Insurance Coverage:  2025: MEDICARE A/B, AARP, MEDICAID 30V  - NO AUTH  Plan of Care to transfer to [facility] PT after 4/18/2025    Current Problem  Problem List Items Addressed This Visit             ICD-10-CM    Acute pain of left shoulder M25.512       Precautions:  History of cervical fusion  CANCER HISTORY  SEIZURE HISTORY  migraines     Relevant PMH:  Cervical fusion  CVA with left hemiparesis  Skin cancer  Seizure history  Bilateral TKA  Bilatearlal CTS     Pain  Left shoulder  5-6/10      Subjective  General  Patient reports an occurrence of 1 seizure this past Friday or Saturday, noting her main symptom is LOB and falling. Patient states she \"wall walks\" noting she does not like using canes or walkers. She has been busy preparing her flower beds for planting soon and took a friend out for a birthday dinner, noting she has been compliant w/ the HEP except the day of the birthday dinner.    Objective   TTP and tightness in LUT and L side paraspinals vs R palpated during STM     Treatment:  Manual  STM bilateral   CPS/UT seated    Therapeutic Exercise  UBE L2 F/B x 2/2  Pulleys flexion 2'/scaption x 1'    OTB rows 2 x 10  OTB EXT 2 x 10    OTB IR 2 x 10  OTB ADD 2 x 10 L/R  OTB ABD 2 x 10    Shoulder flexion and abduction rolls on ball on table x 10     Current HEP:  HEP to be completed daily, exercises include:  Table slide flexion  Table slide ABDuction  OTB rows  OTB EXT    OTB IR   OTB ER     Has patient been compliant with HEP? Yes    Activity tolerance:  Good    OP EDUCATION:  HEP  Postural " awareness    Assessment:  Pt's response to treatment:  Good tolerance of use of OTB for therex. Soreness reported   Areas of improvements:  Decreased L UT and paraspinals pain and tightness following STM, Improved exercise endurance w/ L shoulder.    Limitations/deficits: Increased shoulder pain with ADLs, pt unable to determine which therex/activity will cause increase     Pain end of session:   4-5/10 soreness, L shoulder     Plan:  Continue with current POC with the following changes advance as able    Assessment of current progress against goals:  Progressing toward functional goals       Goals:  STG(3 visits)  Patient to be independent with HEP     LTG(10 visits)  SPADI to be <30 % impaired  Patient to perform ADLs with pain <2/10  AROM left shoulder = right  MMT left= to right

## 2025-04-09 NOTE — PROGRESS NOTES
Physical Therapy Treatment    Patient Name: Khadra Earl  MRN: [id number]  Encounter date:  4/10/2025                Visit Number:  4 (including evaluation)  Planned total visits: 10  Visits Authorized/Insurance Coverage:  2025: MEDICARE A/B, AARP, MEDICAID 30V  - NO AUTH    [facility] of Care to [facility] to [facility] after 4/18/2025    Current Problem  Problem List Items Addressed This Visit    None          Precautions:  History of cervical fusion  CANCER HISTORY  SEIZURE HISTORY  migraines     Pain       Subjective  General        ***    Objective  ***    Treatment:    Treatment:  Manual  STM bilateral   CPS/UT seated     Therapeutic Exercise  UBE L2 F/B x 2/2  Pulleys flexion 2'/scaption x 1'     OTB rows 2 x 10  OTB EXT 2 x 10     OTB IR 2 x 10  OTB ADD 2 x 10 L/R  OTB ABD 2 x 10     Shoulder flexion and abduction rolls on ball on table x 10      Current HEP:  HEP to be completed daily, exercises include:  Table slide flexion  Table slide ABDuction  OTB rows  OTB EXT     OTB IR   OTB ER      Has patient been compliant with HEP? {Yes/No:62365}    Activity tolerance:  {Activity:38000}    OP EDUCATION:       Assessment:  Pt's response to treatment:  ***  Areas of improvements:  ***  Limitations/deficits:  ***    Pain end of session: ***    Plan:  {BASPLAN:13716}    Assessment of current progress against goals:  {BASPTNOTEGOALASSESSMENT:16149}       Goals:  STG(3 visits)  Patient to be independent with HEP     LTG(10 visits)  SPADI to be <30 % impaired  Patient to perform ADLs with pain <2/10  AROM left shoulder = right  MMT left= to right

## 2025-04-10 ENCOUNTER — APPOINTMENT (OUTPATIENT)
Dept: PHYSICAL THERAPY | Facility: CLINIC | Age: 75
End: 2025-04-10
Payer: MEDICARE

## 2025-04-10 ENCOUNTER — DOCUMENTATION (OUTPATIENT)
Dept: PHYSICAL THERAPY | Facility: CLINIC | Age: 75
End: 2025-04-10
Payer: MEDICARE

## 2025-04-15 ENCOUNTER — INFUSION (OUTPATIENT)
Dept: HEMATOLOGY/ONCOLOGY | Facility: CLINIC | Age: 75
End: 2025-04-15
Payer: MEDICARE

## 2025-04-15 ENCOUNTER — TELEPHONE (OUTPATIENT)
Dept: PRIMARY CARE | Facility: CLINIC | Age: 75
End: 2025-04-15
Payer: MEDICARE

## 2025-04-15 VITALS
WEIGHT: 272.71 LBS | TEMPERATURE: 97.3 F | SYSTOLIC BLOOD PRESSURE: 108 MMHG | OXYGEN SATURATION: 99 % | HEART RATE: 52 BPM | RESPIRATION RATE: 16 BRPM | DIASTOLIC BLOOD PRESSURE: 58 MMHG | BODY MASS INDEX: 53.26 KG/M2

## 2025-04-15 DIAGNOSIS — G25.81 RESTLESS LEGS SYNDROME: ICD-10-CM

## 2025-04-15 DIAGNOSIS — G43.711 CHRONIC MIGRAINE WITHOUT AURA, WITH INTRACTABLE MIGRAINE, SO STATED, WITH STATUS MIGRAINOSUS: ICD-10-CM

## 2025-04-15 DIAGNOSIS — G43.119 INTRACTABLE MIGRAINE WITH AURA WITHOUT STATUS MIGRAINOSUS: ICD-10-CM

## 2025-04-15 PROCEDURE — 96365 THER/PROPH/DIAG IV INF INIT: CPT | Mod: INF

## 2025-04-15 PROCEDURE — 2500000004 HC RX 250 GENERAL PHARMACY W/ HCPCS (ALT 636 FOR OP/ED): Performed by: PSYCHIATRY & NEUROLOGY

## 2025-04-15 RX ORDER — EPINEPHRINE 0.3 MG/.3ML
0.3 INJECTION SUBCUTANEOUS EVERY 5 MIN PRN
Status: DISCONTINUED | OUTPATIENT
Start: 2025-04-15 | End: 2025-04-15 | Stop reason: HOSPADM

## 2025-04-15 RX ORDER — FAMOTIDINE 10 MG/ML
20 INJECTION, SOLUTION INTRAVENOUS ONCE AS NEEDED
OUTPATIENT
Start: 2025-07-14

## 2025-04-15 RX ORDER — FAMOTIDINE 10 MG/ML
20 INJECTION, SOLUTION INTRAVENOUS ONCE AS NEEDED
Status: DISCONTINUED | OUTPATIENT
Start: 2025-04-15 | End: 2025-04-15 | Stop reason: HOSPADM

## 2025-04-15 RX ORDER — DIPHENHYDRAMINE HYDROCHLORIDE 50 MG/ML
50 INJECTION, SOLUTION INTRAMUSCULAR; INTRAVENOUS AS NEEDED
Status: DISCONTINUED | OUTPATIENT
Start: 2025-04-15 | End: 2025-04-15 | Stop reason: HOSPADM

## 2025-04-15 RX ORDER — HEPARIN SODIUM,PORCINE/PF 10 UNIT/ML
50 SYRINGE (ML) INTRAVENOUS AS NEEDED
OUTPATIENT
Start: 2025-04-15

## 2025-04-15 RX ORDER — EPINEPHRINE 0.3 MG/.3ML
0.3 INJECTION SUBCUTANEOUS EVERY 5 MIN PRN
OUTPATIENT
Start: 2025-07-14

## 2025-04-15 RX ORDER — HEPARIN 100 UNIT/ML
500 SYRINGE INTRAVENOUS AS NEEDED
Status: DISCONTINUED | OUTPATIENT
Start: 2025-04-15 | End: 2025-04-15 | Stop reason: HOSPADM

## 2025-04-15 RX ORDER — ALBUTEROL SULFATE 0.83 MG/ML
3 SOLUTION RESPIRATORY (INHALATION) AS NEEDED
Status: DISCONTINUED | OUTPATIENT
Start: 2025-04-15 | End: 2025-04-15 | Stop reason: HOSPADM

## 2025-04-15 RX ORDER — HEPARIN SODIUM,PORCINE/PF 10 UNIT/ML
50 SYRINGE (ML) INTRAVENOUS AS NEEDED
Status: DISCONTINUED | OUTPATIENT
Start: 2025-04-15 | End: 2025-04-15 | Stop reason: HOSPADM

## 2025-04-15 RX ORDER — DIPHENHYDRAMINE HYDROCHLORIDE 50 MG/ML
50 INJECTION, SOLUTION INTRAMUSCULAR; INTRAVENOUS AS NEEDED
OUTPATIENT
Start: 2025-07-14

## 2025-04-15 RX ORDER — HEPARIN 100 UNIT/ML
500 SYRINGE INTRAVENOUS AS NEEDED
OUTPATIENT
Start: 2025-04-15

## 2025-04-15 RX ORDER — ALBUTEROL SULFATE 0.83 MG/ML
3 SOLUTION RESPIRATORY (INHALATION) AS NEEDED
OUTPATIENT
Start: 2025-07-14

## 2025-04-15 RX ORDER — ROPINIROLE 3 MG/1
3 TABLET, FILM COATED ORAL NIGHTLY
Qty: 90 TABLET | Refills: 3 | Status: SHIPPED | OUTPATIENT
Start: 2025-04-15

## 2025-04-15 RX ADMIN — EPTINEZUMAB-JJMR 300 MG: 100 INJECTION INTRAVENOUS at 12:53

## 2025-04-15 ASSESSMENT — PAIN SCALES - GENERAL: PAINLEVEL_OUTOF10: 8

## 2025-04-15 NOTE — PROGRESS NOTES
Patient recieved Vyepti Infusion with no s/s of reaction or complication. Gave and reviewed schedule with patient. Addressed questions and concerns. Patient left infusion with friend in stable condition.

## 2025-04-15 NOTE — SIGNIFICANT EVENT
04/15/25 1222   Prechemo Checklist   Has the patient been in the hospital, ED, or urgent care since last date of service No   Chemo/Immuno Consent Completed and Signed Not applicable   Protocol/Indications Verified Yes   Confirmed to previous date/time of medication Yes   Compared to previous dose Yes   All medications are dated accurately Yes   Pregnancy Test Negative Not applicable   Parameters Met Yes   BSA/Weight-Height Verified Yes   Dose Calculations Verified (current, total, cumulative) Yes

## 2025-04-16 ENCOUNTER — TREATMENT (OUTPATIENT)
Dept: PHYSICAL THERAPY | Facility: CLINIC | Age: 75
End: 2025-04-16
Payer: MEDICARE

## 2025-04-16 DIAGNOSIS — M25.512 ACUTE PAIN OF LEFT SHOULDER: ICD-10-CM

## 2025-04-16 PROCEDURE — 97110 THERAPEUTIC EXERCISES: CPT | Mod: GP,CQ

## 2025-04-16 PROCEDURE — 97140 MANUAL THERAPY 1/> REGIONS: CPT | Mod: GP,CQ

## 2025-04-16 ASSESSMENT — PAIN - FUNCTIONAL ASSESSMENT: PAIN_FUNCTIONAL_ASSESSMENT: 0-10

## 2025-04-16 ASSESSMENT — PAIN SCALES - GENERAL: PAINLEVEL_OUTOF10: 6

## 2025-04-16 NOTE — PROGRESS NOTES
"    Physical Therapy Treatment    Patient Name: Khadra Earl  MRN: [id number]  Encounter date:  4/16/2025  Time Calculation  Start Time: 1430  Stop Time: 1514  Time Calculation (min): 44 min             Visit Number:  4 (including evaluation)  Planned total visits: 10  Visits Authorized/Insurance Coverage:  2025: MEDICARE A/B, AARP, MEDICAID 30V  - NO AUTH    Plan of Care to transfer to [facility] PT after 4/18/2025    Current Problem  Problem List Items Addressed This Visit           ICD-10-CM    Acute pain of left shoulder M25.512           Precautions:  History of cervical fusion  CANCER HISTORY  SEIZURE HISTORY  migraines     Pain   6/10 anterior posterior shoulder    Subjective  General      Patient reports that her most difficult tasks are putting dishes away or hanging clothes up secondary to the shoulder elevation.     Objective  Tightness in L lateral neck, UT and scapula >R    Treatment:    Treatment:  Manual  STM bilateral   CPS/UT seated     Therapeutic Exercise  UBE L2 F/B x 2/2  Pulleys flexion 2'/scaption x 1'    Doorway stretch with low UE 3 x15\"     GTB rows 2 x 10  GTB EXT 2 x 10     GTB IR 2 x 10  GTB ADD 2 x 10 L/R  GTB ABD 2 x 10     Shoulder flexion and abduction rolls on ball on table x 10 DNP  Wall slides with YSB x20     Current HEP:  HEP to be completed daily, exercises include:  Table slide flexion  Table slide ABDuction  GTB rows  GTB EXT     GTB IR   GTB ER      Has patient been compliant with HEP? Yes      OP EDUCATION:       Assessment:  Pt's response to treatment:  Patient was able to tolerate an increase in TB as well as wall slides v. Table at this date without an increase in pain.    Areas of improvements: Tolerance to exercises  Limitations/deficits:  L UE Weakness and pain    Pain end of session: SAME    Plan:  Continue with current POC/no changes   Add shoulder shrugs and manual scapular mobs if appropriate.    Assessment of current progress against goals:  Progressing " toward functional goals       Goals:  STG(3 visits)  Patient to be independent with HEP     LTG(10 visits)  SPADI to be <30 % impaired  Patient to perform ADLs with pain <2/10  AROM left shoulder = right  MMT left= to right

## 2025-04-16 NOTE — TELEPHONE ENCOUNTER
She says you made an error because you have always prescribed for q8h. Can you refer her somewhere for this  ?

## 2025-04-22 ENCOUNTER — TELEMEDICINE (OUTPATIENT)
Dept: PRIMARY CARE | Facility: CLINIC | Age: 75
End: 2025-04-22
Payer: MEDICARE

## 2025-04-22 ENCOUNTER — TREATMENT (OUTPATIENT)
Dept: PHYSICAL THERAPY | Facility: CLINIC | Age: 75
End: 2025-04-22
Payer: MEDICARE

## 2025-04-22 DIAGNOSIS — G25.81 RESTLESS LEGS SYNDROME: ICD-10-CM

## 2025-04-22 DIAGNOSIS — M25.512 ACUTE PAIN OF LEFT SHOULDER: ICD-10-CM

## 2025-04-22 PROCEDURE — 97140 MANUAL THERAPY 1/> REGIONS: CPT | Mod: GP,CQ

## 2025-04-22 PROCEDURE — 97110 THERAPEUTIC EXERCISES: CPT | Mod: GP,CQ

## 2025-04-22 RX ORDER — ROPINIROLE 3 MG/1
3 TABLET, FILM COATED ORAL 3 TIMES DAILY
Qty: 90 TABLET | Refills: 0 | Status: SHIPPED | OUTPATIENT
Start: 2025-04-22

## 2025-04-22 NOTE — ASSESSMENT & PLAN NOTE
Begin to wean dose down to 3 mg/day   Refill sent x 30 days, then pt to call   Pt to call vascular to discuss her symptoms     Orders:    rOPINIRole (Requip) 3 mg tablet; Take 1 tablet (3 mg) by mouth 3 times a day.    I spent a total of 10 minutes on the date of the service which included preparing to see the patient, completing clinical documentation, obtaining and/or reviewing separately obtained history, counseling and educating the patient on medication dosages and  ordering medications.     Impression and plan is reflected in the note above as well as the orders.     Plan was discussed with the patient and patient signalled understanding of the plan.

## 2025-04-22 NOTE — PATIENT INSTRUCTIONS
Called, left vm for pt to return call to office.   Call r/t Covid-19 test result Restless legs syndrome  Begin to wean dose down to 3 mg/day   Refill sent x 30 days, then pt to call   Pt to call vascular to discuss her symptoms     Orders:    rOPINIRole (Requip) 3 mg tablet; Take 1 tablet (3 mg) by mouth 3 times a day.    RTC AS SCHEDULED

## 2025-04-22 NOTE — PROGRESS NOTES
"    Physical Therapy Treatment    Patient Name: Khadra Earl  MRN: [id number]  Encounter date:  4/22/2025  Time Calculation  Start Time: 1230  Stop Time: 1315  Time Calculation (min): 45 min     PT Therapeutic Procedures Time Entry  Manual Therapy Time Entry: 23  Therapeutic Exercise Time Entry: 15       Visit Number:  5 (including evaluation)  Planned total visits: 10  Visits Authorized/Insurance Coverage:  2025: MEDICARE A/B, AARP, MEDICAID 30V  - NO AUTH    Plan of Care to transfer to [facility] PT after 4/18/2025    Current Problem  Problem List Items Addressed This Visit           ICD-10-CM    Acute pain of left shoulder M25.512     Precautions:  History of cervical fusion  CANCER HISTORY  SEIZURE HISTORY  migraines     Pain  7/10 upper trap goes up to the neck.     Subjective  General     \"My regular doctor wrote my restless leg script for one pill a night and I have been taking three pills a night. I am only getting about one hour a night.\"     Objective  120 degrees without pain AAROM flexion with cane     Treatment:     Therapeutic Exercise  UBE L2 F/B x 2/2  Pulleys flexion 2'/scaption x 2'     Manual  Supine with LE over wedge   Occipital releasees   STM occipitals   Gentle cervical traction   Gentle cervical ROM - lateral side bend good but cervical rotation catches due to C4-6 cadaver bone.   STM bilateral CPS/UT   R/L upper trap stretch  B upper trap stretch      Therapeutic Exercise  Flexion series   -lap to 90 degrees   - 90 to 120 degrees   -lap to 120 degrees   Horizontal abduction     Manual  Seated   STM bilateral CPS/UT     Current HEP:  HEP to be completed daily, exercises include:  Table slide flexion  Table slide ABDuction  GTB rows  GTB EXT  GTB IR   GTB ER   Scapular retraction  Cervical retraction      Has patient been compliant with HEP? Yes    OP EDUCATION:   Postural awareness     Assessment:  Resumed supine with wedge manual and introduced AAROM with cane. The patients pain down " ever all at exit.     Pain end of session: 4/10    Plan:  Continue with current POC/no changes   Add shoulder shrugs and manual scapular mobs if appropriate.    Assessment of current progress against goals:  Progressing toward functional goals       Goals:  STG(3 visits)  Patient to be independent with HEP     LTG(10 visits)  SPADI to be <30 % impaired  Patient to perform ADLs with pain <2/10  AROM left shoulder = right  MMT left= to right

## 2025-04-22 NOTE — PROGRESS NOTES
Khadra Earl is a 74 y.o. female who presents via phone to discuss pts medications     This visit was completed via telephone (audio only) between the patient (at the originating site) and the provider (at the distant site).  While technically available, the patient was unable or unwilling to consent to connect via audio/video telehealth technology.  All issues as below were discussed and addressed but no physical exam was performed.  If it was felt that the patient should be evaluated in clinic then they were directed there.  The patient verbally consented to this visit.      Chief Complaint   Patient presents with    Med Refill     Ropinirole        Pt requested refill on Ropinirole 3 mg THREE times/day which she has been taking for years  I have educated pt that maximum dose is 3 mg per DAY and that I have spoken with my collaborator, Dr. Amador and she agrees that pt needs weaned down and if her sx are uncontrollable she needs to be seen by vascular  Pt states she has seen Dr. Mcmanus in the past, can't remember last visit, but will call Dr. Mcmanus to discuss this   I have discussed weaning here over the next few months, I will refill for 3x/day today for 30 days, then we are going down to twice/day x 30 days and if she is able to see vascular in the meantime, we can discuss the plan or vascular can take over the Rx  Pt agreed with POC at this time and did not have any further questions.     RX Allergies[1]    Review of Systems  ROS was completed and all systems are negative with the exception of what was noted in the the HPI.       Objective   Vitals:  There were no vitals taken for this visit.      Current Outpatient Medications   Medication Instructions    acetaminophen (Tylenol) 325 mg tablet Every 8 hours    amitriptyline (Elavil) 25 mg tablet Nightly    baclofen (LIORESAL) 10 mg, As needed    benzonatate (TESSALON) 100 mg, As needed    celecoxib (CELEBREX) 200 mg, oral, Daily PRN    cetirizine  (ZYRTEC) 10 mg, oral, Daily    cholecalciferol, vitamin D3, (VITAMIN D3 ORAL) 1 capsule, Daily    cyanocobalamin (Vitamin B-12) 1,000 mcg tablet 1 tablet, Daily    dicyclomine (BENTYL) 10 mg, oral, 4 times daily    eptinezumab (Vyepti) IV in 100 mL  mg, Once    fluocinonide 0.05 % cream 1 Application    furosemide (LASIX) 20 mg, oral, Daily PRN    ketoconazole (NIZOral) 2 % cream 1 Application, Daily    ketorolac (Sprix) nasal 1 SPRAY IN EACH NOSTRIL EVERY 6 TO 8 HOURS. MAXIMUM DOSE 8 SPRAYS IN 24 HOUR PERIOD    ketorolac (Toradol) 10 mg tablet     lamoTRIgine (LAMICTAL XR) 50 mg, oral, Daily, For 1 week then increase to 100 mg tablet    lamoTRIgine (LAMICTAL XR) 100 mg, oral, Daily, After completing 7 days of 50mg lamotrigine    lamoTRIgine (LAMICTAL) 100 mg, oral, Every morning, Half tab x 1 week then increase to full tab    levothyroxine (SYNTHROID, LEVOXYL) 125 mcg, oral, Daily    meclizine (ANTIVERT) 25 mg, Every 8 hours PRN    multivitamin (Multiple Vitamins) tablet Take by mouth.    mupirocin (Bactroban) 2 % ointment APPLY SMALL AMOUNT TOPICALLY TO THE AFFECTED AREA THREE TIMES DAILY AS DIRECTED    oxybutynin (DITROPAN) 5 mg, oral, 3 times daily    potassium chloride CR (Klor-Con) 10 mEq ER tablet 10 mEq, oral, 3 times daily, Do not crush, chew, or split.    predniSONE (Deltasone) 10 mg tablet Take 3 tablets daily x 5 days, then take 2 tablets daily x 5 days, then take 1 tablet daily x 5 days    rOPINIRole (REQUIP) 3 mg, oral, 3 times daily    rosuvastatin (CRESTOR) 20 mg, oral, Nightly    sucralfate (CARAFATE) 1 g, 4 times daily before meals and nightly    traMADol (Ultram) 50 mg tablet Take by mouth.    ubrogepant (Ubrelvy) 100 mg tablet TAKE ONE (1) TABLET BY MOUTH AT ONSET OF MIGRAINE. MAY REPEAT DOSE IN 2 HOURS IF NEEDED. MAXIMUM  MG (2 TABLETS) IN A 24 HOUR PERIOD.         Physical Exam  Np PE due to phone call   Assessment & Plan  Restless legs syndrome  Begin to wean dose down to 3 mg/day    Refill sent x 30 days, then pt to call   Pt to call vascular to discuss her symptoms     Orders:    rOPINIRole (Requip) 3 mg tablet; Take 1 tablet (3 mg) by mouth 3 times a day.    I spent a total of 10 minutes on the date of the service which included preparing to see the patient, completing clinical documentation, obtaining and/or reviewing separately obtained history, counseling and educating the patient on medication dosages and  ordering medications.     Impression and plan is reflected in the note above as well as the orders.     Plan was discussed with the patient and patient signalled understanding of the plan.               [1]   Allergies  Allergen Reactions    Zolpidem Other     Severe loss of balance hearing and SI . Does not work    Other Nausea/vomiting     Local anesthesia    Gabapentin Other     SEVERE GAIT ISSUES    Magnesium Unknown    Magnesium Carbonate Hallucinations    Metoclopramide Hcl Unknown     akathisia    Sertraline Dizziness    Sulfa (Sulfonamide Antibiotics) Unknown    Adhesive Tape-Silicones Rash

## 2025-04-23 NOTE — PROGRESS NOTES
Physical Therapy Treatment    Patient Name: Khadra Earl  MRN: [id number]  Encounter date:  4/24/2025  Time Calculation  Start Time: 1115  Stop Time: 1200  Time Calculation (min): 45 min     PT Therapeutic Procedures Time Entry  Manual Therapy Time Entry: 25  Therapeutic Exercise Time Entry: 15       Visit Number:  6 (including evaluation)  Planned total visits: 10  Visits Authorized/Insurance Coverage:  2025: MEDICARE A/B, AARP, MEDICAID 30V  - NO AUTH    Plan of Care to transfer to [facility] PT after 4/18/2025    Current Problem  Problem List Items Addressed This Visit           ICD-10-CM    Acute pain of left shoulder M25.512       Precautions:  History of cervical fusion  CANCER HISTORY  SEIZURE HISTORY  migraines     Pain  8/10 migraine headache    Subjective  General   Patient reports a seizure episode yesterday and migraine headache today that is more noticeable than her L side neck pain. Patient notes she prefers using Sprix nasal spray to help her sleep which helps resolve her migraine vs NSAIDs or other pain medication.     Objective   LLE AAROM into approximately 120 deg, decreased vs RLE    Treatment:     Therapeutic Exercise  UBE L2 F/B x 2/2  Pulleys flexion 2'/scaption x 2'     Manual  Supine with LE over wedge   Occipital releasees   STM occipitals   Gentle cervical traction   Gentle cervical ROM - lateral side bend good but cervical rotation catches due to C4-6 cadaver bone.   STM bilateral CPS/UT   R/L upper trap stretch     Therapeutic Exercise  Flexion series   -lap to 90 degrees   - 90 to 120 degrees   -lap to 120 degrees   Horizontal abduction     Current HEP:  HEP to be completed daily, exercises include:  Table slide flexion  Table slide ABDuction  GTB rows  GTB EXT  GTB IR   GTB ER   Scapular retraction  Cervical retraction      Has patient been compliant with HEP? Yes    OP EDUCATION:  Postural awareness     Assessment:        Good response to manual treatment in supine. Decreased  L side cervical, L shoulder pain, and migraine pain after. Patient was able to perform all exercises as in prior visits w/o increased migraine headache.     Pain end of session: 5/10    Plan:  Continue with current POC/no changes   Add shoulder shrugs and manual scapular mobs if appropriate.    Assessment of current progress against goals:  Progressing toward functional goals       Goals:  STG(3 visits)  Patient to be independent with HEP     LTG(10 visits)  SPADI to be <30 % impaired  Patient to perform ADLs with pain <2/10  AROM left shoulder = right  MMT left= to right

## 2025-04-24 ENCOUNTER — TREATMENT (OUTPATIENT)
Dept: PHYSICAL THERAPY | Facility: CLINIC | Age: 75
End: 2025-04-24
Payer: MEDICARE

## 2025-04-24 DIAGNOSIS — M25.512 ACUTE PAIN OF LEFT SHOULDER: ICD-10-CM

## 2025-04-24 PROCEDURE — 97140 MANUAL THERAPY 1/> REGIONS: CPT | Mod: CQ,GP | Performed by: SPECIALIST/TECHNOLOGIST

## 2025-04-24 PROCEDURE — 97110 THERAPEUTIC EXERCISES: CPT | Mod: CQ,GP | Performed by: SPECIALIST/TECHNOLOGIST

## 2025-04-28 DIAGNOSIS — G25.81 RESTLESS LEGS SYNDROME: ICD-10-CM

## 2025-04-28 NOTE — PROGRESS NOTES
"    Physical Therapy Treatment    Patient Name: Khadra Earl  MRN: [id number]  Encounter date:  4/29/2025  Time Calculation  Start Time: 1230  Stop Time: 1312  Time Calculation (min): 42 min     PT Therapeutic Procedures Time Entry  Manual Therapy Time Entry: 12  Therapeutic Exercise Time Entry: 30       Visit Number:  7 (including evaluation)  Planned total visits: 10  Visits Authorized/Insurance Coverage:  2025: MEDICARE A/B, AARP, MEDICAID 30V  - NO AUTH    Plan of Care to transfer to [facility] PT after 4/18/2025    Current Problem  Problem List Items Addressed This Visit           ICD-10-CM    Acute pain of left shoulder M25.512         Precautions:  History of cervical fusion  CANCER HISTORY  SEIZURE HISTORY  migraines     Pain  7 of 10  Subjective  General   Pt reports good tolerance to last session and good compliance with HEP. Pt reports still experiencing difficulty with rasing L arm above head (I.e into cupboard or to wash hair).   Pt reports pain \"is not bad today\" rating at 7 of 10.       Objective   LLE AAROM into approximately 160 deg (flexion and scaption)  with pulleys     Treatment:     Therapeutic Exercise  UBE L2 F/B x 2/2  Pulleys flexion 2'/scaption x 2'  Ball on wall OH flexion stretch YSB  x 20  Ball on wall alphabet, rainbow ball, x 26 reps   Band ER/rhomboid, orange, 2 x 10   Band supine horizontal abduction 2 x 10;  orange band     Manual  Supine with LE over wedge   Occipital releasees   STM occipitals   STM bilateral CPS/UT   R/L upper trap stretch        Current HEP:  Access Code: [email or web address]  URL: [email or web address]  Date: 04/29/2025  Prepared by: Jaskaran Orlando    Exercises  - Shoulder External Rotation and Scapular Retraction with Resistance  - 1-2 x daily - 6 x weekly - 2 sets - 10 reps  - Standing Shoulder Horizontal Abduction with Resistance  - 1-2 x daily - 6 x weekly - 2 sets - 10 reps    HEP to be completed daily, exercises include:  Table slide flexion  Table " slide ABDuction  GTB rows  GTB EXT  GTB IR   GTB ER   Scapular retraction  Cervical retraction      Has patient been compliant with HEP? Yes    OP EDUCATION:  Updated HEP     Assessment:      Patient tolerated treatment well. Patient appears to be working hard in clinic and motivated to decrease pain and restore all functional deficits. Verbal and/or tactile cues given for proper form, and avoidance of substitution patterns with all exercises. All infection control policies followed during this visit. No observed antalgia with exercise performance, though pt provided intermittent verbal and tactile cueing to decrease upper trap hyperactivity.     Pain end of session: 7/10    Plan:  Continue with current POC/no changes   Add shoulder shrugs and manual scapular mobs if appropriate.    Assessment of current progress against goals:  Progressing toward functional goals       Goals:  STG(3 visits)  Patient to be independent with HEP     LTG(10 visits)  SPADI to be <30 % impaired  Patient to perform ADLs with pain <2/10  AROM left shoulder = right  MMT left= to right

## 2025-04-29 ENCOUNTER — TELEPHONE (OUTPATIENT)
Dept: NEUROLOGY | Facility: CLINIC | Age: 75
End: 2025-04-29

## 2025-04-29 ENCOUNTER — TREATMENT (OUTPATIENT)
Dept: PHYSICAL THERAPY | Facility: CLINIC | Age: 75
End: 2025-04-29
Payer: MEDICARE

## 2025-04-29 DIAGNOSIS — M25.512 ACUTE PAIN OF LEFT SHOULDER: ICD-10-CM

## 2025-04-29 PROCEDURE — 97110 THERAPEUTIC EXERCISES: CPT | Mod: GP,CQ

## 2025-04-29 PROCEDURE — 97140 MANUAL THERAPY 1/> REGIONS: CPT | Mod: GP,CQ

## 2025-04-29 ASSESSMENT — PAIN SCALES - GENERAL: PAINLEVEL_OUTOF10: 7

## 2025-04-29 ASSESSMENT — PAIN - FUNCTIONAL ASSESSMENT: PAIN_FUNCTIONAL_ASSESSMENT: 0-10

## 2025-04-29 NOTE — TELEPHONE ENCOUNTER
"----- Message from Quyen York sent at 4/29/2025  1:25 PM EDT -----  Regarding: RE: ropinarole  That is fine. If she would like to optimize with our movement disorder doctors we could do that otherwise happy to just fill  ----- Message -----  From: Jane Dent RN  Sent: 4/29/2025  12:56 PM EDT  To: Quyen York MD  Subject: ropinarole                                       Khadra calling very upset because she has been taking 3mg ropinarole 3 times daily for restless leg \"for years\" PCP will no longer prescribe. I am Unsure why she has been taking during the day. She is requesting you prescribe as  \"it is a neurological problem\" I am guessing it is a no but just wanted to update you on the situation. PCP note below\"Pt requested refill on Ropinirole 3 mg THREE times/day which she has been taking for yearsI have educated pt that maximum dose is 3 mg per DAY and that I have spoken with my collaborator, Dr. Amador and she agrees that pt needs weaned down and if her sx are uncontrollable she needs to be seen by vascularPt states she has seen Dr. Mcmanus in the past, can't remember last visit, but will call Dr. Mcmanus to discuss this I have discussed weaning here over the next few months, I will refill for 3x/day today for 30 days, then we are going down to twice/day x 30 days and if she is able to see vascular in the meantime, we can discuss the plan or vascular can take over the Rx\"  "

## 2025-04-29 NOTE — PROGRESS NOTES
"    Physical Therapy Treatment    Patient Name: Khadra Earl  MRN: [id number]  Encounter date:  5/1/2025  Time Calculation  Start Time: 1200  Stop Time: 1245  Time Calculation (min): 45 min     PT Therapeutic Procedures Time Entry  Manual Therapy Time Entry: 10  Therapeutic Exercise Time Entry: 30       Visit Number:  8 (including evaluation)  Planned total visits: 10  Visits Authorized/Insurance Coverage:  2025: MEDICARE A/B, AARP, MEDICAID 30V  - NO AUTH    Plan of Care to transfer to [facility] PT after 4/18/2025    Current Problem  Problem List Items Addressed This Visit           ICD-10-CM    Acute pain of left shoulder M25.512     Precautions:  History of cervical fusion  CANCER HISTORY  SEIZURE HISTORY  migraines     Pain   Patient reported L side neck pain more than L shoulder     Subjective  General   Pt reports good tolerance to last session and good compliance with HEP. Pt reports still experiencing difficulty with rasing L arm above head (I.e into cupboard or to wash hair).   Pt reports pain \"is not bad today\" rating at 7 of 10.       Objective   LUE AAROM into flexion near symmetrical to RUE during pulleys into and supine wand raises into flexion.      Treatment:     Therapeutic Exercise  UBE L2 F/B x 2/2  Pulleys flexion 2'/scaption x 2'  Ball on wall OH flexion stretch YSB  x 20  Ball on wall alphabet, rainbow ball, x 26 reps, BUE at same time   Band ER/rhomboid, orange, 2 x 10   Band supine horizontal abduction 2 x 10; orange band   Supine OH flexion w/ cane 2 x 10  Supine B shoulder abduction x 20 L/R  Wall walks 2 x 10, w/ orange theraband    Manual:   Supine with LE over wedge   Occipital releasees   STM occipitals   STM bilateral CPS/UT   R/L upper trap stretch    Current HEP:  Access Code: [email or web address]  URL: [email or web address]  Date: 04/29/2025  Prepared by: Jaskaran Orlando    Exercises  - Shoulder External Rotation and Scapular Retraction with Resistance  - 1-2 x daily - 6 x weekly - " "2 sets - 10 reps  - Standing Shoulder Horizontal Abduction with Resistance  - 1-2 x daily - 6 x weekly - 2 sets - 10 reps    HEP to be completed daily, exercises include:  Table slide flexion  Table slide ABDuction  GTB rows  GTB EXT  GTB IR   GTB ER   Scapular retraction  Cervical retraction      Has patient been compliant with HEP? Yes    OP EDUCATION:  Updated HEP     Assessment:      Patient had good response to STM to B paraspinals and UT., decreased L side tightness. Cueing provided as needed to exercise instruction to refine performance and decrease compensatory movement. Decreased L side cervical pain following treatment.     Pain end of session: \"It feels better\"    Plan:  Continue with current POC/no changes   Add shoulder shrugs and manual scapular mobs if appropriate.    Assessment of current progress against goals:  Progressing toward functional goals       Goals:  STG(3 visits)  Patient to be independent with HEP     LTG(10 visits)  SPADI to be <30 % impaired  Patient to perform ADLs with pain <2/10  AROM left shoulder = right  MMT left= to right  "

## 2025-05-01 ENCOUNTER — TREATMENT (OUTPATIENT)
Dept: PHYSICAL THERAPY | Facility: CLINIC | Age: 75
End: 2025-05-01
Payer: MEDICARE

## 2025-05-01 DIAGNOSIS — M25.512 ACUTE PAIN OF LEFT SHOULDER: ICD-10-CM

## 2025-05-01 PROCEDURE — 97110 THERAPEUTIC EXERCISES: CPT | Mod: CQ,GP | Performed by: SPECIALIST/TECHNOLOGIST

## 2025-05-01 PROCEDURE — 97140 MANUAL THERAPY 1/> REGIONS: CPT | Mod: CQ,GP | Performed by: SPECIALIST/TECHNOLOGIST

## 2025-05-02 ENCOUNTER — OFFICE VISIT (OUTPATIENT)
Dept: NEUROLOGY | Facility: HOSPITAL | Age: 75
End: 2025-05-02
Payer: MEDICARE

## 2025-05-02 VITALS
SYSTOLIC BLOOD PRESSURE: 166 MMHG | WEIGHT: 272 LBS | TEMPERATURE: 97.1 F | BODY MASS INDEX: 53.4 KG/M2 | RESPIRATION RATE: 18 BRPM | DIASTOLIC BLOOD PRESSURE: 82 MMHG | HEIGHT: 60 IN | HEART RATE: 59 BPM

## 2025-05-02 DIAGNOSIS — R60.0 LOWER LEG EDEMA: ICD-10-CM

## 2025-05-02 DIAGNOSIS — G40.909 SEIZURE DISORDER (MULTI): Primary | ICD-10-CM

## 2025-05-02 PROCEDURE — 1036F TOBACCO NON-USER: CPT | Performed by: PSYCHIATRY & NEUROLOGY

## 2025-05-02 PROCEDURE — 99215 OFFICE O/P EST HI 40 MIN: CPT | Performed by: PSYCHIATRY & NEUROLOGY

## 2025-05-02 PROCEDURE — 1157F ADVNC CARE PLAN IN RCRD: CPT | Performed by: PSYCHIATRY & NEUROLOGY

## 2025-05-02 PROCEDURE — 1159F MED LIST DOCD IN RCRD: CPT | Performed by: PSYCHIATRY & NEUROLOGY

## 2025-05-02 PROCEDURE — 1123F ACP DISCUSS/DSCN MKR DOCD: CPT | Performed by: PSYCHIATRY & NEUROLOGY

## 2025-05-02 PROCEDURE — 1126F AMNT PAIN NOTED NONE PRSNT: CPT | Performed by: PSYCHIATRY & NEUROLOGY

## 2025-05-02 RX ORDER — LAMOTRIGINE 200 MG/1
200 TABLET, EXTENDED RELEASE ORAL DAILY
Qty: 30 TABLET | Refills: 11 | Status: SHIPPED | OUTPATIENT
Start: 2025-05-02 | End: 2026-05-02

## 2025-05-02 ASSESSMENT — PAIN SCALES - GENERAL: PAINLEVEL_OUTOF10: 0-NO PAIN

## 2025-05-02 NOTE — PROGRESS NOTES
"Last visit 1-  Started Lamictal. Titrated to 100 mg .   Not having seizures as often.   Drops to the floor. 6 in past month. Used to be more, she states.  Does not know how long they last. A lot of variability in occurrence for her. Has started to write them down per office request  Confusion as she is coming out or them.     No headaches today.     Stopped acetazolamide. Has noticed no change in any symptoms. \"I don't know what it was for\"    Celebrex has not really been helpful for migraine or headaches.   Ubrelvy did not help.  Ketorolac oral or Sprix is the most helpful to treat headaches. Has never needed more than 1 tab and 1 spray    Treating 2 migraines per week. Down from 3-5 days a week.  Migraine occurs up neck and behind eyes. Describes as pressure behind eyes.   Associated nausea, dysequilibrium, vision changes- blurry/double  Triggers are bright lights.     Continues Vyepti 300 mg. Last 4-. Feels it is helpful to decrease migraines frequency and severity.     Hard time sleeping due to neck pain. Is going to PT for neck pain. Difficult to turn her head.   Averaging 4 hours.     Requip 3mg TID for restless leg. Neurology to take over script.        Latest Reference Range & Units 02/28/25 13:37   CREATININE 0.60 - 1.00 mg/dL 0.92   EGFR > OR = 60 mL/min/1.73m2 65             To review what we discussed today   Assessment/Plan   Problem List Items Addressed This Visit       Seizure disorder (Multi) - Primary    Lamictal increase to 200 mg XR a day. The first 3 days of increase you may feel dizzy or off.          Relevant Medications    lamoTRIgine (LaMICtal XR) 200 mg tablet extended release 24hr 24 hr tablet       Your next appointment with me is 6/27/2025.    Thank you for visiting the office of Dr Quyen York. It was a pleasure working with you today.   [address] Mon-Thursday  [facility] 5th floor [facility] Fridays  Our office phone number is [phone]. You can use " this number to leave messages for Jane or to schedule or reschedule an appointment or request refills.  If you were seen on Thursday afternoon or Friday our office will call on Monday or Tuesday to reschedule your appointment. If you do not receive a call please call us.   We are also available for messages on my chart. We make every effort to respond to your concerns by the end of the next business day.

## 2025-05-05 NOTE — PROGRESS NOTES
"    Physical Therapy Treatment    Patient Name: Khadra Earl  MRN: [id number]  Encounter date:  5/6/2025  Time Calculation  Start Time: 1230  Stop Time: 1314  Time Calculation (min): 44 min     PT Therapeutic Procedures Time Entry  Manual Therapy Time Entry: 10  Therapeutic Exercise Time Entry: 34       Visit Number:  9 (including evaluation)  Planned total visits: 10  Visits Authorized/Insurance Coverage:  2025: MEDICARE A/B, AARP, MEDICAID 30V  - NO AUTH    [facility] of Care to transfer to [facility] after 4/18/2025    Current Problem  Problem List Items Addressed This Visit           ICD-10-CM    Acute pain of left shoulder M25.512       Precautions:  History of cervical fusion  CANCER HISTORY  SEIZURE HISTORY  migraines     Pain   Patient reported L side neck pain more than L shoulder     Subjective  General   Pt reports good tolerance to last session and good compliance with HEP. Pt reports some slow, subtle improvement, now able to lift left arm a little easier.  Pt reports she was able to work in the yard a little bit yesterday, putting up a light [place].     Pt reports pain \"is not bad today\" rating at 6 of 10.   Pt reports some L sided neck muscle soreness after having to put curlers into her hair.      Objective   LUE AAROM into flexion near symmetrical to RUE during pulleys into and supine wand raises into flexion.   Pt demonstrates approximately 160 degrees of L shoulder AROM flexion.   Minor tightness and tenderness to palpation L upper trapezius and levator scapula insertion.     Treatment:     Therapeutic Exercise  UBE L2 F/B x 2/2  Seated gentle AROM cervical rotation R and L  x 10   Ball on wall OH flexion stretch YSB  x 20  Ball on wall alphabet, rainbow ball, x 26 reps, BUE at same time   Band ER/rhomboid, green, 2 x 10   Wall walks  R and L 1 x 10, w/ orange theraband  Band supine horizontal abduction 2 x 10; green band   Supine OH flexion w/ cane 2 x 10        Manual:   Supine with LE over " wedge   Occipital release -gentle  STM occipitals   STM bilateral CPS/UT   R/L upper trap stretch    Current HEP:  Access Code: [email or web address]  URL: [email or web address]  Date: 04/29/2025  Prepared by: Jaskaran Orlando    Exercises  - Shoulder External Rotation and Scapular Retraction with Resistance  - 1-2 x daily - 6 x weekly - 2 sets - 10 reps  - Standing Shoulder Horizontal Abduction with Resistance  - 1-2 x daily - 6 x weekly - 2 sets - 10 reps    HEP to be completed daily, exercises include:  Table slide flexion  Table slide ABDuction  GTB rows  GTB EXT  GTB IR   GTB ER   Scapular retraction  Cervical retraction      Has patient been compliant with HEP? Yes    OP EDUCATION:  Rationale for good postural awareness during sewing and quilting.     Assessment:    Patient tolerated treatment well. Patient appears to be working hard in clinic and motivated to decrease pain and restore all functional deficits. Verbal and/or tactile cues given for proper form, and avoidance of substitution patterns with all exercises. All infection control policies followed during this visit. No observed antalgia with exercise performance.  Pt does seem to respond with manual intervention, with L upper trapezius palpating softer to touch at conclusion of manual.  Pt reports a subjective decrease in pain intensity.     Pain end of session:  5 of 10    Plan:  Continue with current POC/no changes      Assessment of current progress against goals:  Progressing toward functional goals       Goals:  STG(3 visits)  Patient to be independent with HEP     LTG(10 visits)  SPADI to be <30 % impaired  Patient to perform ADLs with pain <2/10  AROM left shoulder = right  MMT left= to right

## 2025-05-06 ENCOUNTER — TREATMENT (OUTPATIENT)
Dept: PHYSICAL THERAPY | Facility: CLINIC | Age: 75
End: 2025-05-06
Payer: MEDICARE

## 2025-05-06 DIAGNOSIS — M25.512 ACUTE PAIN OF LEFT SHOULDER: ICD-10-CM

## 2025-05-06 PROCEDURE — 97140 MANUAL THERAPY 1/> REGIONS: CPT | Mod: GP,CQ

## 2025-05-06 PROCEDURE — 97110 THERAPEUTIC EXERCISES: CPT | Mod: GP,CQ

## 2025-05-07 NOTE — PROGRESS NOTES
Physical Therapy Treatment/ Discharge    Patient Name: Khadra Earl  MRN: [id number]  Encounter date:  5/8/2025  Time Calculation  Start Time: 1218  Stop Time: 1303  Time Calculation (min): 45 min     PT Therapeutic Procedures Time Entry  Manual Therapy Time Entry: 13  Therapeutic Exercise Time Entry: 28       Visit Number:  10 (including evaluation)  Planned total visits: 10  Visits Authorized/Insurance Coverage:  2025: MEDICARE A/B, AARP, MEDICAID 30V  - NO AUTH    Plan of Care to transfer to [facility] PT after 4/18/2025    Current Problem  Problem List Items Addressed This Visit           ICD-10-CM    Acute pain of left shoulder M25.512         Precautions:  History of cervical fusion  CANCER HISTORY  SEIZURE HISTORY  migraines     Pain   0/10 with sitting and not moving head    Subjective  General   Pt reports she is more sore as she fell last night during seizure and hit her L shoulder. Pt states she has seizures and falls during them. Pt reports sleeping on L side continues to aggravate her pain.      Objective     Shoulder AROM L R   Flexion 113 deg 140 deg   Extension WFL deg WFL deg   Abduction 98 deg 130 deg   External Rotation 63 deg 70 deg   Internal Rotation T12 deg T7 deg       Treatment:     Therapeutic Exercise  UBE L2 F/B x 2/2  Seated gentle AROM cervical rotation R and L  x 10   Ball on wall OH flexion stretch YSB  x 20  Ball on wall alphabet, rainbow ball, x 26 reps, BUE at same time   Band ER/rhomboid, green, 2 x 10   Wall walks  R and L 1 x 10, w/ orange theraband  Band supine horizontal abduction 2 x 10; green band   Supine OH flexion w/ cane 2 x 10        Manual:   Supine with LE in hooklying   Occipital release -gentle  STM occipitals   STM bilateral CPS/UT   R/L upper trap stretch  PT educated pt on self massage using massage roller with stick    Current HEP:  Access Code: [email or web address]  URL: [email or web address]  Date: 04/29/2025  Prepared by: Jaskaran Chavez  -  Shoulder External Rotation and Scapular Retraction with Resistance  - 1-2 x daily - 6 x weekly - 2 sets - 10 reps  - Standing Shoulder Horizontal Abduction with Resistance  - 1-2 x daily - 6 x weekly - 2 sets - 10 reps    HEP to be completed daily, exercises include:  Table slide flexion  Table slide ABDuction  GTB rows  GTB EXT  GTB IR   GTB ER   Scapular retraction     Has patient been compliant with HEP? Yes    OP EDUCATION:  Rationale for good postural awareness during sewing and quilting.     Assessment:    Pt tolerance to treatment: Pt is independent with current HEP.  L shoulder AROM has not changed significantly with PT.  Pt reports most benefit form manual but relief was transient      Pain end of session: 0/10 at rest    Plan:  Discharge      Assessment of current progress against goals:  Progressing toward functional goals       Goals:  STG(3 visits)  Patient to be independent with HEP- MET     LTG(10 visits)  SPADI to be <30 % impaired  Patient to perform ADLs with pain <2/10- PROGRESSING  AROM left shoulder = right- NOT MET  MMT left= to right- PROGRESSING

## 2025-05-08 ENCOUNTER — APPOINTMENT (OUTPATIENT)
Dept: PRIMARY CARE | Facility: CLINIC | Age: 75
End: 2025-05-08
Payer: MEDICARE

## 2025-05-08 ENCOUNTER — TREATMENT (OUTPATIENT)
Dept: PHYSICAL THERAPY | Facility: CLINIC | Age: 75
End: 2025-05-08
Payer: MEDICARE

## 2025-05-08 DIAGNOSIS — M25.512 ACUTE PAIN OF LEFT SHOULDER: ICD-10-CM

## 2025-05-08 PROCEDURE — 97110 THERAPEUTIC EXERCISES: CPT | Mod: GP

## 2025-05-08 PROCEDURE — 97140 MANUAL THERAPY 1/> REGIONS: CPT | Mod: GP

## 2025-05-10 PROCEDURE — RXMED WILLOW AMBULATORY MEDICATION CHARGE

## 2025-05-20 ENCOUNTER — SPECIALTY PHARMACY (OUTPATIENT)
Dept: PHARMACY | Facility: CLINIC | Age: 75
End: 2025-05-20

## 2025-05-22 ENCOUNTER — SPECIALTY PHARMACY (OUTPATIENT)
Dept: PHARMACY | Facility: CLINIC | Age: 75
End: 2025-05-22

## 2025-05-23 ENCOUNTER — PHARMACY VISIT (OUTPATIENT)
Dept: PHARMACY | Facility: CLINIC | Age: 75
End: 2025-05-23
Payer: COMMERCIAL

## 2025-05-28 NOTE — PROGRESS NOTES
Verbal consent of the patient and/or verbal parental consent for patients under the age of 18 have been obtained to conduct a physical examination at this office visit.  The , [facility], was present in the room during the entire visit including, but not limited to the physical examination!.     Established patient  History Of Present Illness  05/28/25 Khadra Earl is a 74 y.o. female who presents for follow up evaluation of her Left KNEE. Patient has been doing PT for her shoulder     All previous Progress Notes and imaging results related to this patients chief complaint have been reviewed in preparation for this examination.    Past Medical History  She has a past medical history of Acute bronchitis, unspecified (04/18/2022), Bariatric surgery status (10/28/2020), Other chest pain (04/18/2022), Other conditions influencing health status (04/18/2022), Personal history of malignant melanoma of skin, Personal history of other specified conditions (06/07/2021), and Personal history of other specified conditions (03/18/2022).    Surgical History  She has a past surgical history that includes Tonsillectomy (04/10/2014); Appendectomy (04/10/2014); Hemicolectomy (04/10/2014); Dilation and curettage of uterus (04/10/2014); Cholecystectomy (04/10/2014); Small intestine surgery (04/10/2014); Total knee arthroplasty (04/10/2014); Other surgical history (04/10/2014); Other surgical history (04/10/2014); Breast lumpectomy (04/10/2014); Lymph node biopsy (04/10/2014); Hernia repair (04/10/2014); Other surgical history (08/10/2021); Other surgical history (02/16/2022); Adenoidectomy (02/01/2018); CT guided imaging for needle placement (06/11/2012); and Breast biopsy (Bilateral).     Social History  She reports that she has never smoked. She has never been exposed to tobacco smoke. She has never used smokeless tobacco. She reports that she does not drink alcohol and does not use drugs.    Family History  Family  History   Problem Relation Name Age of Onset    Uterine cancer Mother      Ovarian cancer Mother      Alzheimer's disease Mother      Anxiety disorder Father      Skin cancer Father      Alzheimer's disease Father      Breast cancer Sister  35    Alzheimer's disease Sister      Ovarian cancer Maternal Grandmother          Historical Clinical Intake:  02/20/25 Khadra Earl is a 74 y.o. female who presents for an evaluation of their Left KNEE. The patient states that she has started noticing her left knee pain three weeks ago but doesn't recall doing anything to cause the pain and she denies falling on it and she states that she is not very activeThe patient does not have stairs at her house so she doesn't often use stairs and she states that her knee has not given out on her.  She states that she also woke up yesterday and noticed redness and swelling in her Left lower leg that is still going on today. She states that she has also noticed the right leg with redness and swelling today too.  She states that she initially went to her primary care where they did x-rays.   The patient has a past history of knee replacements in both knees and tried calling the doctor who did her knee replacement be he is no longer working.  The patient sates that her pain is a 5/10 today.   The patient states that she does see a vascular doctor and it was recommended that she schedules an appointment with that doctor.   -----------------------------------------------------  03/13/25 Khadra Earl is a 74 y.o. female who presents for CT scan review of their Left KNEE. States that she is having 6/10 pain in the left knee with decreased swelling since the last time she was seen. States that her pains are still in the same spots as before.  I stressed to her the importance of getting into physical therapy.  We talked in detail about the CT scan results and I recommended she get started into aquatic therapy.  I stressed to her the  importance of good shoes as well as shoes with good stability control as well as insoles in her shoes.  We also talked about potential injections in the future down the line.     Allergies  Zolpidem, Other, Gabapentin, Magnesium, Magnesium carbonate, Metoclopramide hcl, Sertraline, Sulfa (sulfonamide antibiotics), and Adhesive tape-silicones    Review of Systems  CONSTITUTIONAL:   Negative for weight change, loss of appetite, fatigue, weakness, fever, chills, night sweats, headaches .           HEENT:   Negative for cold, cough, sore throat, sinus pain, swollen lymph nodes.           OPHTHALMOLOGY:   Negative for diminished vision, blurred vision, loss of vision, double vision.           ALLERGY:   Negative for runny nose, scratchy throat, sinus congestion, rash, facial pressure, nasal congestion, post-nasal drip.           CARDIOLOGY:   Negative for chest pain, palpitations, murmurs, irregular heart beat, shortness of breath, leg edema, dyspnea on exertion, fatigue, dizziness.           RESPIRATORY:   Negative for chest pain, shortness of breath, swelling of the legs, asthma/copd, chest congestion, pain with breathing .           GASTROENTEROLOGY:   Negative for nausea, vomitting, heartburn, constipation, diarrhea, blood in stool, change in bowel habits, black stool.           HEMATOLOGY/LYMPH:   Negative for fatigue, loss of appetitie, easy bruising, easy bleeding, anemia, abnormal bleeding, slow healing.           ENDOCRINOLOGY:   Negative for polyuria, polydipsia, polyphagia, fatigue, weight loss, weight gain, cold intolerance, heat intolerance, diabetes.           MUSCULOSKELETAL:   Positive  for Left knee pain        DERMATOLOGY:   Negative for rash, bruising.           NEUROLOGY:   Negative for tingling, numbness, gait abnormality, paresthesias, weakness, sciatica.        Examination: All findings relatively unchanged since previous visit  Location: LEFT Knee medial and lateral joint line.  BILATERAL Lower  Leg.  Edema: Positive  Diffusely. Bilateral pitting edema in lower leg (+2)  Effusion: Negative.   Percussion Test:  Negative.   Tuning Fork Test:  Negative.   Ecchymosis/Bruising: Negative.   Observation: Positive bilateral leatherly and shiny appearance of lower legs as well as painful red and darkened skin that is very taunt.             Palpation: All findings relatively unchanged since previous visit  Positive  Left medial and lateral joint line pain as well as distal quadricep    Orientation: Positive  Asymmetrical: because of the swelling. All findings relatively unchanged since previous visit    Range of Motion:  All findings relatively unchanged since previous visit  Positive Knee Flexion ( 0-135 degrees) Decreased because of pain and swelling  Positive Knee Extension ( 0-15 degrees) Decreased because of pain and swelling           Muscle Strength:  All findings relatively unchanged since previous visit  Positive +4/+5 Quadricep Extension Causes pain  Positive +4/+5 Hamstring Flexion Causes pain  +5+5 Gastrocnemius  +5+5 Soleus.            Proprioception: All findings relatively unchanged since previous visit  Pain with Squat: Positive    Pain with Sumo Squat:  Positive   Hop Test: Positive    Single leg balance test Positive            Vascular:   +2/+4 Femoral  +2/+4 Dorsalis Pedis  +2/+4 Posterior Tibial  Capillary Refill less than 2 seconds.           KNEE - MCL / LCL:All findings relatively unchanged since previous visit  Valgus Stress Test: 90 degrees:Negative. 20-30 degrees: Negative.   Varus Stress Test:  90 degrees: Negative, 20-30 degrees: Negative.   Apley Distraction Test:Negative.   Thessaly Test: Positive unable to perform because of pain            KNEE - PATELLA:All findings relatively unchanged since previous visit  Apprehension Test:  Positive   Glide Test:  Positive   Grind Test: Positive   Patella Tracking Test: Positive              KNEE - QUADRICEPS:    All findings relatively  unchanged since previous visit     VMO Test: Positive   VLO Test:  Negative    Hip/Pelvis - Sacrum:  Leg Length Supine: Positive Will verify at future office visit due to pain and limited ROM   Leg Length Supine to Seated (Derbolowsky Sign): Positive Will verify at future office visit due to pain and limited ROM    Hip Flexor Tightness: Positive BILATERAL  Hamstring Tightness: Positive BILATERAL          Feet/Foot: Positive BILATERAL Valgus foot      Lower Leg:  Sejal's Sign: Positive B/L    Imaging and Diagnostics Review:  CT KNEE LEFT WO IV CONTRAST; ;  3/3/2025 10:45 am      ORDERING CLINICIAN:  IVON BARRAZA      FINDINGS:  Femoral component of the total knee arthroplasty demonstrates no  sheyla-implant lucency within limits of this CT. No discrete fracture  demonstrated.      Tibial component of the knee arthroplasty demonstrates no  sheyla-implant lucency or fracture. Patella demonstrates no evidence  for fracture.      Extensor mechanism is intact. There is small knee joint effusion.  Musculature about the knee demonstrates no asymmetric atrophy.  Subcutaneous tissues demonstrate incision scar within the  infrapatellar location.          IMPRESSION CT of the left knee March 3, 2025:  1. Unremarkable visualized total knee arthroplasty. No lucency in  particular about the femoral component within limits of the CT.   2.  small knee joint effusion      Signed by: Mckenna Goel 3/3/2025 11:05 AM      XR knee left 3 views  Order date: 2/12/2025  Authorizing: JAVIER Armijo  Ordered by JAVIER Armijo on 2/12/2025.     Narrative & Impression    Interpreted By:  Schoenberger, Joseph,   STUDY:  XR KNEE LEFT 3 VIEWS; ;  2/12/2025 8:42 am      ORDERING CLINICIAN:  CODI PEREZ      FINDINGS:  Lucency is developing superior to the medial aspect of the femoral  component of the left knee arthroplasty. This may be due to loosening  or stress shielding.. No acute fracture is noted.       IMPRESSION:  Developing lucency at the metal bone interface at the medial aspect  of the femoral component of the total knee arthroplasty. This may be due to loosening  or stress shielding. See discussion above.      Signed by: Joseph Schoenberger 2/13/2025 9:09 AM  Dictation workstation:   [id number]       Assessment   No diagnosis found.        Treatment or Intervention:  Continue to alternate ice and moist heat as needed  ,   Start into aquatic/physical Therapy 1-2 times a week for 8-10 weeks with manual therapy as well as dry needling and IASTM  ,   Stressed the importance of wearing shoes with good stability control to help with the biomechanics affecting the knees as well as the lower extremities  ,   Stressed the importance of wearing full foot insoles to help with the biomechanics affecting the knees as well as the lower extremities,   Once again recommendation over-the-counter calcium with vitamin-D 2 -3000+ milligrams a day, as well as a daily multivitamin.  ,   Once again recommendation over-the-counter curcumin, turmeric, boswellia, as well as egg shell membrane as directed to aid with joint inflammation.  ,   Once again recommendation over-the-counter move Free to help for joint health  Patient advised regarding the risks and/or potential adverse reactions and/or side effects of any prescribed medications along with any over-the-counter medications or any supplements used. Patient advised to seek immediate medical care if any adverse reactions occur. The patient and/or patient(s) parent(s) verbalized their understanding,   Discussed in detail with the patient to the level of their understanding the possibility in the future of regenerative injections viscosupplementation injections versus a combination  Possibility of future referral back to orthopedics for revision of previous knee replacement  Continue follow-ups with Dr. Mcmanus for  lymphedema, lipedema, and venous insufficiency as well as  chronic venous hypertension affecting lower extremities   REFERRAL TO ORTHOPEDIC & PROSTHETICS FOR CUSTOM ORTHOTICS AND AFO BRACING LEFT lower leg for foot drop.  Follow-up     Please note that this report has been produced using speech recognition software.  It may contain errors related to grammar, punctuation or spelling.  Electronically signed, but not reviewed.  Jay Bae D.O. ABIEL, Director of Sports Medicine   TAMMIE ELLIS on 5/28/25 at 8:46 AM.     ABIEL Yoon DO

## 2025-05-29 ENCOUNTER — APPOINTMENT (OUTPATIENT)
Dept: ORTHOPEDIC SURGERY | Facility: CLINIC | Age: 75
End: 2025-05-29
Payer: MEDICARE

## 2025-06-05 ENCOUNTER — TELEPHONE (OUTPATIENT)
Dept: NEUROLOGY | Facility: CLINIC | Age: 75
End: 2025-06-05
Payer: MEDICARE

## 2025-06-05 DIAGNOSIS — G25.81 RESTLESS LEGS SYNDROME: ICD-10-CM

## 2025-06-05 RX ORDER — ROPINIROLE 3 MG/1
3 TABLET, FILM COATED ORAL 3 TIMES DAILY
Qty: 90 TABLET | Refills: 3 | Status: SHIPPED | OUTPATIENT
Start: 2025-06-05

## 2025-06-19 ENCOUNTER — SPECIALTY PHARMACY (OUTPATIENT)
Dept: PHARMACY | Facility: CLINIC | Age: 75
End: 2025-06-19

## 2025-06-19 PROCEDURE — RXMED WILLOW AMBULATORY MEDICATION CHARGE

## 2025-06-20 ENCOUNTER — APPOINTMENT (OUTPATIENT)
Dept: NEUROLOGY | Facility: HOSPITAL | Age: 75
End: 2025-06-20
Payer: MEDICARE

## 2025-06-25 ENCOUNTER — PHARMACY VISIT (OUTPATIENT)
Dept: PHARMACY | Facility: CLINIC | Age: 75
End: 2025-06-25
Payer: COMMERCIAL

## 2025-06-27 ENCOUNTER — APPOINTMENT (OUTPATIENT)
Dept: NEUROLOGY | Facility: HOSPITAL | Age: 75
End: 2025-06-27
Payer: MEDICARE
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
## 2025-07-14 ENCOUNTER — INFUSION (OUTPATIENT)
Dept: HEMATOLOGY/ONCOLOGY | Facility: CLINIC | Age: 75
End: 2025-07-14
Payer: MEDICARE

## 2025-07-14 VITALS
SYSTOLIC BLOOD PRESSURE: 138 MMHG | TEMPERATURE: 98.4 F | HEART RATE: 62 BPM | BODY MASS INDEX: 54.04 KG/M2 | OXYGEN SATURATION: 98 % | WEIGHT: 276.7 LBS | DIASTOLIC BLOOD PRESSURE: 85 MMHG | RESPIRATION RATE: 16 BRPM

## 2025-07-14 DIAGNOSIS — G43.711 CHRONIC MIGRAINE WITHOUT AURA, WITH INTRACTABLE MIGRAINE, SO STATED, WITH STATUS MIGRAINOSUS: ICD-10-CM

## 2025-07-14 PROCEDURE — 96365 THER/PROPH/DIAG IV INF INIT: CPT | Mod: INF

## 2025-07-14 PROCEDURE — 2500000004 HC RX 250 GENERAL PHARMACY W/ HCPCS (ALT 636 FOR OP/ED): Performed by: PSYCHIATRY & NEUROLOGY

## 2025-07-14 RX ORDER — DIPHENHYDRAMINE HYDROCHLORIDE 50 MG/ML
50 INJECTION, SOLUTION INTRAMUSCULAR; INTRAVENOUS AS NEEDED
Status: DISCONTINUED | OUTPATIENT
Start: 2025-07-14 | End: 2025-07-14 | Stop reason: HOSPADM

## 2025-07-14 RX ORDER — DIPHENHYDRAMINE HYDROCHLORIDE 50 MG/ML
50 INJECTION, SOLUTION INTRAMUSCULAR; INTRAVENOUS AS NEEDED
OUTPATIENT
Start: 2025-10-12

## 2025-07-14 RX ORDER — HEPARIN 100 UNIT/ML
500 SYRINGE INTRAVENOUS AS NEEDED
OUTPATIENT
Start: 2025-07-14

## 2025-07-14 RX ORDER — HEPARIN SODIUM,PORCINE/PF 10 UNIT/ML
50 SYRINGE (ML) INTRAVENOUS AS NEEDED
OUTPATIENT
Start: 2025-07-14

## 2025-07-14 RX ORDER — EPINEPHRINE 0.3 MG/.3ML
0.3 INJECTION SUBCUTANEOUS EVERY 5 MIN PRN
OUTPATIENT
Start: 2025-10-12

## 2025-07-14 RX ORDER — ALBUTEROL SULFATE 0.83 MG/ML
3 SOLUTION RESPIRATORY (INHALATION) AS NEEDED
Status: DISCONTINUED | OUTPATIENT
Start: 2025-07-14 | End: 2025-07-14 | Stop reason: HOSPADM

## 2025-07-14 RX ORDER — FAMOTIDINE 10 MG/ML
20 INJECTION, SOLUTION INTRAVENOUS ONCE AS NEEDED
OUTPATIENT
Start: 2025-10-12

## 2025-07-14 RX ORDER — EPINEPHRINE 0.3 MG/.3ML
0.3 INJECTION SUBCUTANEOUS EVERY 5 MIN PRN
Status: DISCONTINUED | OUTPATIENT
Start: 2025-07-14 | End: 2025-07-14 | Stop reason: HOSPADM

## 2025-07-14 RX ORDER — ALBUTEROL SULFATE 0.83 MG/ML
3 SOLUTION RESPIRATORY (INHALATION) AS NEEDED
OUTPATIENT
Start: 2025-10-12

## 2025-07-14 RX ORDER — FAMOTIDINE 10 MG/ML
20 INJECTION, SOLUTION INTRAVENOUS ONCE AS NEEDED
Status: DISCONTINUED | OUTPATIENT
Start: 2025-07-14 | End: 2025-07-14 | Stop reason: HOSPADM

## 2025-07-14 RX ADMIN — EPTINEZUMAB-JJMR 300 MG: 100 INJECTION INTRAVENOUS at 13:51

## 2025-07-14 ASSESSMENT — PAIN SCALES - GENERAL: PAINLEVEL_OUTOF10: 8

## 2025-07-14 NOTE — PROGRESS NOTES
Education Documentation  Returning to Work/School/Activities, taught by Carline Reeves RN at 7/14/2025  4:19 PM.  Learner: Patient  Readiness: Acceptance  Method: Explanation  Response: Verbalizes Understanding    Stress Management, taught by Carline Reeves RN at 7/14/2025  4:19 PM.  Learner: Patient  Readiness: Acceptance  Method: Explanation  Response: Verbalizes Understanding    Changing Role with Self and Family, taught by Carline Reeves RN at 7/14/2025  4:19 PM.  Learner: Patient  Readiness: Acceptance  Method: Explanation  Response: Verbalizes Understanding    Leisure Education, taught by Carline Reeves RN at 7/14/2025  4:19 PM.  Learner: Patient  Readiness: Acceptance  Method: Explanation  Response: Verbalizes Understanding    Health Systems & Community Resources, taught by Carline Reeves RN at 7/14/2025  4:19 PM.  Learner: Patient  Readiness: Acceptance  Method: Explanation  Response: Verbalizes Understanding    Advanced Medical Directives, taught by Carline Reeves RN at 7/14/2025  4:19 PM.  Learner: Patient  Readiness: Acceptance  Method: Explanation  Response: Verbalizes Understanding    Escort, Parking, Transportation Home, taught by Carline Reeves RN at 7/14/2025  4:19 PM.  Learner: Patient  Readiness: Acceptance  Method: Explanation  Response: Verbalizes Understanding    Referrals to Support Services, taught by Carline Reeves RN at 7/14/2025  4:19 PM.  Learner: Patient  Readiness: Acceptance  Method: Explanation  Response: Verbalizes Understanding    Support Systems, taught by Carline Reeves RN at 7/14/2025  4:19 PM.  Learner: Patient  Readiness: Acceptance  Method: Explanation  Response: Verbalizes Understanding    Financial Assistance Information, taught by Carline Reeves RN at 7/14/2025  4:19 PM.  Learner: Patient  Readiness: Acceptance  Method: Explanation  Response: Verbalizes Understanding    Financial Benefits Summary, taught by Carline Reeves RN at 7/14/2025  4:19 PM.  Learner: Patient  Readiness: Acceptance  Method:  Explanation  Response: Verbalizes Understanding    Healthy Lifestyle, taught by Carline Reeves RN at 7/14/2025  4:19 PM.  Learner: Patient  Readiness: Acceptance  Method: Explanation  Response: Verbalizes Understanding    Monitoring Weight, taught by Carline Reeves RN at 7/14/2025  4:19 PM.  Learner: Patient  Readiness: Acceptance  Method: Explanation  Response: Verbalizes Understanding    Tips for Daily Living, taught by Carline Reeves RN at 7/14/2025  4:19 PM.  Learner: Patient  Readiness: Acceptance  Method: Explanation  Response: Verbalizes Understanding    Nutrition/Diet, taught by Carline Reeves RN at 7/14/2025  4:19 PM.  Learner: Patient  Readiness: Acceptance  Method: Explanation  Response: Verbalizes Understanding    Food-Drug Interactions, taught by Carline Reeves RN at 7/14/2025  4:19 PM.  Learner: Patient  Readiness: Acceptance  Method: Explanation  Response: Verbalizes Understanding    Nutrition Related Education, taught by Carline Reeves RN at 7/14/2025  4:19 PM.  Learner: Patient  Readiness: Acceptance  Method: Explanation  Response: Verbalizes Understanding    Pain Medication Actions & Side Effects, taught by Carline Reeves RN at 7/14/2025  4:19 PM.  Learner: Patient  Readiness: Acceptance  Method: Explanation  Response: Verbalizes Understanding    Patient Controlled Analgesia, taught by Carline Reeves RN at 7/14/2025  4:19 PM.  Learner: Patient  Readiness: Acceptance  Method: Explanation  Response: Verbalizes Understanding    Discuss the Use of Pain Control Measures Before Pain Becomes Severe, taught by Carline Reeves RN at 7/14/2025  4:19 PM.  Learner: Patient  Readiness: Acceptance  Method: Explanation  Response: Verbalizes Understanding    Pain Management, taught by Carline Reeves RN at 7/14/2025  4:19 PM.  Learner: Patient  Readiness: Acceptance  Method: Explanation  Response: Verbalizes Understanding    Pain Rating Scale, taught by Carline Reeves RN at 7/14/2025  4:19 PM.  Learner: Patient  Readiness:  Acceptance  Method: Explanation  Response: Verbalizes Understanding    Shortness of Breath, taught by Carline Reeves RN at 7/14/2025  4:19 PM.  Learner: Patient  Readiness: Acceptance  Method: Explanation  Response: Verbalizes Understanding    Changes in Appetite, taught by Carline Reeves RN at 7/14/2025  4:19 PM.  Learner: Patient  Readiness: Acceptance  Method: Explanation  Response: Verbalizes Understanding    Fertility Issues, taught by Carline Reeves RN at 7/14/2025  4:19 PM.  Learner: Patient  Readiness: Acceptance  Method: Explanation  Response: Verbalizes Understanding    Memory Problems, taught by Carline Reeves RN at 7/14/2025  4:19 PM.  Learner: Patient  Readiness: Acceptance  Method: Explanation  Response: Verbalizes Understanding    Skin and Nail Changes, taught by Carline Reeves RN at 7/14/2025  4:19 PM.  Learner: Patient  Readiness: Acceptance  Method: Explanation  Response: Verbalizes Understanding    Changes in Urination, taught by Carline Reeves RN at 7/14/2025  4:19 PM.  Learner: Patient  Readiness: Acceptance  Method: Explanation  Response: Verbalizes Understanding    Bleeding Precautions, taught by Carline Reeves RN at 7/14/2025  4:19 PM.  Learner: Patient  Readiness: Acceptance  Method: Explanation  Response: Verbalizes Understanding    Edema Management, taught by Carline Reeves RN at 7/14/2025  4:19 PM.  Learner: Patient  Readiness: Acceptance  Method: Explanation  Response: Verbalizes Understanding    Care of Neuropathy, taught by Carline Reeves RN at 7/14/2025  4:19 PM.  Learner: Patient  Readiness: Acceptance  Method: Explanation  Response: Verbalizes Understanding    Infection Control, taught by Carline Reeves RN at 7/14/2025  4:19 PM.  Learner: Patient  Readiness: Acceptance  Method: Explanation  Response: Verbalizes Understanding    Alopecia, taught by Carline Reeves RN at 7/14/2025  4:19 PM.  Learner: Patient  Readiness: Acceptance  Method: Explanation  Response: Verbalizes Understanding    Diarrhea, taught  by Carline Reeves RN at 7/14/2025  4:19 PM.  Learner: Patient  Readiness: Acceptance  Method: Explanation  Response: Verbalizes Understanding    Constipation, taught by Carline Reeves RN at 7/14/2025  4:19 PM.  Learner: Patient  Readiness: Acceptance  Method: Explanation  Response: Verbalizes Understanding    Mouth Sores, taught by Carline Reeves RN at 7/14/2025  4:19 PM.  Learner: Patient  Readiness: Acceptance  Method: Explanation  Response: Verbalizes Understanding    Nausea Management, taught by Carline Reeves RN at 7/14/2025  4:19 PM.  Learner: Patient  Readiness: Acceptance  Method: Explanation  Response: Verbalizes Understanding    Fatigue, taught by Carline Reeves RN at 7/14/2025  4:19 PM.  Learner: Patient  Readiness: Acceptance  Method: Explanation  Response: Verbalizes Understanding    Vyepti, taught by Carline Reeves RN at 7/14/2025  4:19 PM.  Learner: Patient  Readiness: Acceptance  Method: Explanation  Response: Verbalizes Understanding    Treatment Plan and Schedule, taught by Carline Reeves RN at 7/14/2025  4:19 PM.  Learner: Patient  Readiness: Acceptance  Method: Explanation  Response: Verbalizes Understanding    General Medication Information, taught by Carline Reeves RN at 7/14/2025  4:19 PM.  Learner: Patient  Readiness: Acceptance  Method: Explanation  Response: Verbalizes Understanding    Supportive Medications, taught by Carline Reeves RN at 7/14/2025  4:19 PM.  Learner: Patient  Readiness: Acceptance  Method: Explanation  Response: Verbalizes Understanding    Education Comments  No comments found.

## 2025-07-14 NOTE — PROGRESS NOTES
Patient tolerated infusion well. Reviewed patient's schedule with her and she verbalized understanding.

## 2025-07-14 NOTE — SIGNIFICANT EVENT
07/14/25 1242   Prechemo Checklist   Has the patient been in the hospital, ED, or urgent care since last date of service No   Chemo/Immuno Consent Completed and Signed Not applicable   Protocol/Indications Verified Yes   Confirmed to previous date/time of medication Yes   Compared to previous dose Yes   All medications are dated accurately Yes   Pregnancy Test Negative Not applicable   Parameters Met Yes   BSA/Weight-Height Verified Yes   Dose Calculations Verified (current, total, cumulative) Yes

## 2025-07-15 ENCOUNTER — OFFICE VISIT (OUTPATIENT)
Dept: PRIMARY CARE | Facility: CLINIC | Age: 75
End: 2025-07-15
Payer: MEDICARE

## 2025-07-15 DIAGNOSIS — L23.89 MOSQUITO BITE ALLERGY: ICD-10-CM
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
## 2025-07-15 DIAGNOSIS — G25.81 RESTLESS LEGS SYNDROME: ICD-10-CM

## 2025-07-15 DIAGNOSIS — R60.1 GENERALIZED EDEMA: ICD-10-CM

## 2025-07-15 DIAGNOSIS — G40.909 SEIZURE DISORDER (MULTI): ICD-10-CM

## 2025-07-15 DIAGNOSIS — R10.84 GENERALIZED ABDOMINAL PAIN: ICD-10-CM

## 2025-07-15 DIAGNOSIS — E78.5 HYPERLIPIDEMIA LDL GOAL <100: Primary | ICD-10-CM

## 2025-07-15 PROCEDURE — 99214 OFFICE O/P EST MOD 30 MIN: CPT | Performed by: STUDENT IN AN ORGANIZED HEALTH CARE EDUCATION/TRAINING PROGRAM

## 2025-07-15 PROCEDURE — G2211 COMPLEX E/M VISIT ADD ON: HCPCS | Performed by: STUDENT IN AN ORGANIZED HEALTH CARE EDUCATION/TRAINING PROGRAM

## 2025-07-15 PROCEDURE — 1126F AMNT PAIN NOTED NONE PRSNT: CPT | Performed by: STUDENT IN AN ORGANIZED HEALTH CARE EDUCATION/TRAINING PROGRAM

## 2025-07-15 RX ORDER — FUROSEMIDE 40 MG/1
20 TABLET ORAL DAILY PRN
Qty: 60 TABLET | Refills: 2 | Status: SHIPPED | OUTPATIENT
Start: 2025-07-15

## 2025-07-15 RX ORDER — TRIAMCINOLONE ACETONIDE 1 MG/G
OINTMENT TOPICAL 2 TIMES DAILY PRN
Qty: 80 G | Refills: 0 | Status: SHIPPED | OUTPATIENT
Start: 2025-07-15 | End: 2025-11-12

## 2025-07-15 ASSESSMENT — PATIENT HEALTH QUESTIONNAIRE - PHQ9
1. LITTLE INTEREST OR PLEASURE IN DOING THINGS: NOT AT ALL
SUM OF ALL RESPONSES TO PHQ9 QUESTIONS 1 AND 2: 0
2. FEELING DOWN, DEPRESSED OR HOPELESS: NOT AT ALL

## 2025-07-15 ASSESSMENT — ENCOUNTER SYMPTOMS
LOSS OF SENSATION IN FEET: 0
OCCASIONAL FEELINGS OF UNSTEADINESS: 1
DEPRESSION: 0

## 2025-07-15 ASSESSMENT — PAIN SCALES - GENERAL: PAINLEVEL_OUTOF10: 0-NO PAIN

## 2025-07-15 ASSESSMENT — COLUMBIA-SUICIDE SEVERITY RATING SCALE - C-SSRS
2. HAVE YOU ACTUALLY HAD ANY THOUGHTS OF KILLING YOURSELF?: NO
6. HAVE YOU EVER DONE ANYTHING, STARTED TO DO ANYTHING, OR PREPARED TO DO ANYTHING TO END YOUR LIFE?: NO
1. IN THE PAST MONTH, HAVE YOU WISHED YOU WERE DEAD OR WISHED YOU COULD GO TO SLEEP AND NOT WAKE UP?: NO

## 2025-07-15 NOTE — PATIENT INSTRUCTIONS
It was a pleasure to meet you today.    Topical steroid (triamcinolone) for mosquito bite reaction and Lasix refill sent to your pharmacy.    GI referral entered for further evaluation of abdominal pain, call to schedule.    Keep neurology appointment as scheduled next week.    Follow-up with me on 11/7/2025 or shortly thereafter for MEDICARE WELLNESS VISIT.

## 2025-07-15 NOTE — PROGRESS NOTES
"Subjective   Khadra Earl is a 74 y.o. female who presents for [facility] Care.    HPI:      This is a 74-year-old female presenting for [facility] care visit.  She is a former patient of [place] [facility].  Last Medicare Wellness Visit on 11/6/2024.    Hyperlipidemia:  Managed with rosuvastatin 20 mg daily along with lifestyle measures.    Lab Results   Component Value Date    CHOL 166 04/12/2024    CHOL 130 01/05/2023    CHOL 118 08/20/2021     Lab Results   Component Value Date    HDL 47.3 04/12/2024    HDL 59.4 01/05/2023    HDL 51.7 08/20/2021     Lab Results   Component Value Date    LDLCALC 97 04/12/2024     Lab Results   Component Value Date    TRIG 107 04/12/2024    TRIG 69 01/05/2023    TRIG 75 08/20/2021     No components found for: \"CHOLHDL\"    Restless Leg Syndrome:  Taking Requip 3 mg TID.  Follows with neurology.      Seizure Disorder:  Follows with neurology Quyen York.  Has appt next week.  Has   Just collapses, 2 days ago.      Gets IV infusion for migraine treatment every 3 months.      ROS:   Review of systems is essentially negative for all systems except for any identified issues in HPI above.    Objective     /88   Pulse 52   Temp 36.4 °C (97.6 °F)   Ht (!) 1.524 m (5')   Wt 125 kg (276 lb)   SpO2 98%   BMI 53.90 kg/m²      PHYSICAL EXAM    GENERAL  Well-appearing, pleasant and cooperative.  No acute distress.    HEENT  HEAD:   Normocephalic.  Atraumatic.  EYES:  No scleral icterus or conjunctival injection.  NECK:  No adenopathy.  No palpable thyroid enlargement or nodules.    THROAT:  Moist oropharynx without tonsillar enlargement or exudates.    LUNGS:    Clear to auscultation bilaterally.  No wheezes, rales, rhonchi.    CARDIAC:  Regular rate and rhythm.  Normal S1S2.  No murmurs/rubs/gallops.    ABDOMEN:  Soft, non-tender, non-distended.  No hepatosplenomegaly.  Normoactive bowel sounds.    MUSCULOSKELETAL:  No gross abnormalities.   No joint swelling or erythema,.  No " spinal or paraspinal tenderness to palpation.    EXTREMITIES:  No LE edema or cyanosis.      NEURO           Alert and oriented x3. No focal deficits.    PSYCH:          Affect appropriate.           Assessment/Plan   Problem List Items Addressed This Visit       Edema    Relevant Medications    furosemide (Lasix) 40 mg tablet    Hyperlipidemia LDL goal <100 - Primary    Continue rosuvastatin 20 mg daily along with lifestyle measures.         Restless legs syndrome    Continue current management with Requip, managed through neurologist.         Seizure disorder (Multi)    Continue current management and routine follow-up with neurology.         Relevant Medications    furosemide (Lasix) 40 mg tablet     Other Visit Diagnoses         Mosquito bite allergy        Relevant Medications    triamcinolone (Kenalog) 0.1 % ointment      Generalized abdominal pain        Relevant Orders    Referral to Gastroenterology          Patient Instructions   It was a pleasure to meet you today.    Topical steroid (triamcinolone) for mosquito bite reaction and Lasix refill sent to your pharmacy.    GI referral entered for further evaluation of abdominal pain, call to schedule.    Keep neurology appointment as scheduled next week.    Follow-up with me on 11/7/2025 or shortly thereafter for MEDICARE WELLNESS VISIT.    Counseling:   Medication education:    Education: The patient is counseled regarding potential side effects of all new medications.    Understanding:  Patient expressed understanding.    Adherence:  No barriers to adherence identified.         Yokasta Jerez MD

## 2025-07-16 VITALS
HEIGHT: 60 IN | HEART RATE: 52 BPM | BODY MASS INDEX: 54.19 KG/M2 | WEIGHT: 276 LBS | TEMPERATURE: 97.6 F | SYSTOLIC BLOOD PRESSURE: 136 MMHG | OXYGEN SATURATION: 98 % | DIASTOLIC BLOOD PRESSURE: 88 MMHG

## 2025-08-01 ENCOUNTER — OFFICE VISIT (OUTPATIENT)
Dept: NEUROLOGY | Facility: HOSPITAL | Age: 75
End: 2025-08-01
Payer: MEDICARE

## 2025-08-01 VITALS
DIASTOLIC BLOOD PRESSURE: 85 MMHG | RESPIRATION RATE: 16 BRPM | WEIGHT: 275.57 LBS | SYSTOLIC BLOOD PRESSURE: 150 MMHG | HEART RATE: 80 BPM | BODY MASS INDEX: 54.1 KG/M2 | HEIGHT: 60 IN

## 2025-08-01 DIAGNOSIS — G43.711 CHRONIC MIGRAINE WITHOUT AURA, WITH INTRACTABLE MIGRAINE, SO STATED, WITH STATUS MIGRAINOSUS: Primary | ICD-10-CM

## 2025-08-01 DIAGNOSIS — E03.9 ACQUIRED HYPOTHYROIDISM: ICD-10-CM

## 2025-08-01 DIAGNOSIS — F32.A DEPRESSION, UNSPECIFIED DEPRESSION TYPE: ICD-10-CM

## 2025-08-01 DIAGNOSIS — F41.9 ANXIETY DISORDER, UNSPECIFIED TYPE: ICD-10-CM

## 2025-08-01 DIAGNOSIS — E55.9 VITAMIN D DEFICIENCY, UNSPECIFIED: ICD-10-CM

## 2025-08-01 DIAGNOSIS — R42 VERTIGO: Primary | ICD-10-CM

## 2025-08-01 DIAGNOSIS — G25.81 RESTLESS LEGS SYNDROME: ICD-10-CM

## 2025-08-01 PROCEDURE — 1036F TOBACCO NON-USER: CPT | Performed by: PSYCHIATRY & NEUROLOGY

## 2025-08-01 PROCEDURE — 1126F AMNT PAIN NOTED NONE PRSNT: CPT | Performed by: PSYCHIATRY & NEUROLOGY

## 2025-08-01 PROCEDURE — 99215 OFFICE O/P EST HI 40 MIN: CPT | Performed by: PSYCHIATRY & NEUROLOGY

## 2025-08-01 PROCEDURE — 1159F MED LIST DOCD IN RCRD: CPT | Performed by: PSYCHIATRY & NEUROLOGY

## 2025-08-01 RX ORDER — HYDROCORTISONE 25 MG/G
OINTMENT TOPICAL
COMMUNITY
Start: 2025-06-24

## 2025-08-01 RX ORDER — NAPROXEN SODIUM 220 MG/1
TABLET, FILM COATED ORAL EVERY 24 HOURS
COMMUNITY

## 2025-08-01 RX ORDER — BUPROPION HYDROCHLORIDE 150 MG/1
TABLET, EXTENDED RELEASE ORAL 2 TIMES DAILY
COMMUNITY

## 2025-08-01 RX ORDER — TRAZODONE HYDROCHLORIDE 50 MG/1
50 TABLET ORAL NIGHTLY
Qty: 30 TABLET | Refills: 11 | Status: SHIPPED | OUTPATIENT
Start: 2025-08-01 | End: 2026-08-01

## 2025-08-01 RX ORDER — PROMETHAZINE HYDROCHLORIDE 25 MG/1
25 SUPPOSITORY RECTAL EVERY 6 HOURS PRN
COMMUNITY

## 2025-08-01 ASSESSMENT — PATIENT HEALTH QUESTIONNAIRE - PHQ9
SUM OF ALL RESPONSES TO PHQ9 QUESTIONS 1 AND 2: 1
1. LITTLE INTEREST OR PLEASURE IN DOING THINGS: NOT AT ALL
2. FEELING DOWN, DEPRESSED OR HOPELESS: SEVERAL DAYS

## 2025-08-01 ASSESSMENT — ENCOUNTER SYMPTOMS
LOSS OF SENSATION IN FEET: 0
OCCASIONAL FEELINGS OF UNSTEADINESS: 1

## 2025-08-01 ASSESSMENT — PAIN SCALES - GENERAL: PAINLEVEL_OUTOF10: 0-NO PAIN

## 2025-08-01 NOTE — ASSESSMENT & PLAN NOTE
Vyepti plus or minus help. Difficult to tell because of difficulty with lamictal dosing will resume lamictal dosing and see if it is effective also could be second to accidental cessation of synthroid.

## 2025-08-01 NOTE — PROGRESS NOTES
Last Vyepti 300 mg 7-    Never increased lamotrigine to 200mg XL as ordered last visit. Continued to use 100 mg XL.     In last 3 weeks has only had 2 seizures.  States she forgot her log at home but that is a good number for her, she states. . Usually is experiencing more seizures. Usually experiencing 6 per month.   Describes seizures as drops to floor. Does not know how long she is out for. Has not had serious injury, just bruising. Asked that she continue to document occurrence and bring journal to next appointment.   Endorses that memory is sometimes good and sometimes bad has forgot to take Thyroid recently will check this and b12  Did have neuro psych testing done 7-8-2024    Endorsing dysequilibrium is getting worse. Had a fall backwards in her garden which is unusual for her. With her seizures she usually falls forwards.     Unsure if Vyepti  was helpful   3-4 migraines per week. If can treat early with Sprix it can be helpful. Puts her to sleep for 4 hours.   Had a migraine that lasted 2.5 days and sprix was not effective  Does not feel Celebrex has been helpful for migraine  Does not think Ubrelvy helps for migraine. No longer taking  Migraine occurs up neck and behind eyes. Describes as pressure behind eyes.   Associated nausea, dysequilibrium, vision changes- blurry/double  Triggers are bright lights.     Is not sleeping.  Only sleeping 2 hours and does not go back to sleep. Nothing is working. Long standing insomnia but is getting worse. Stopped amitriptylline has trazodone at home. Struggles with constipation   Requip 3mg TID for restless leg continues    Endorses feeling down several days but is not depression. Bupropion 150mg daily  Denies anxiety.     PT completed for neck pain    Meds tried   Sumatriptan- not helpful  Stadol  Ketorolac  amitriptyline  Sprix  Ubrelvy  Lamotrigine  Vyepti  Celebrex  Benedryl  Baclofen        To review what we discussed today   Assessment/Plan   Problem List  Items Addressed This Visit       Hypothyroidism    Relevant Medications    hydrocortisone 2.5 % ointment    Other Relevant Orders    TSH with reflex to Free T4 if abnormal    Chronic migraine without aura, with intractable migraine, so stated, with status migrainosus - Primary    Vyepti plus or minus help. Difficult to tell because of difficulty with lamictal dosing will resume lamictal dosing and see if it is effective also could be second to accidental cessation of synthroid.          Relevant Medications    traZODone (Desyrel) 50 mg tablet    Other Relevant Orders    TSH with reflex to Free T4 if abnormal    Vitamin B12    Comprehensive Metabolic Panel    CBC and Auto Differential    Vitamin D 25-Hydroxy,Total (for eval of Vitamin D levels)    Restless legs syndrome    Refill requip  Add trazodone for sleep          Other Visit Diagnoses         Vitamin D deficiency, unspecified        Relevant Orders    Vitamin D 25-Hydroxy,Total (for eval of Vitamin D levels)            Your next appointment with me is  1 year       Thank you for visiting the office of Dr Quyen York. It was a pleasure working with you today.   [facility] Mon-Thursday  [facility] 5th [facility] Fridays  Our office phone number is [phone]. You can use this number to leave messages for Jane or to schedule or reschedule an appointment or request refills.  If you were seen on Thursday afternoon or Friday our office will call on Monday or Tuesday to reschedule your appointment. If you do not receive a call please call us.   We are also available for messages on my chart. We make every effort to respond to your concerns by the end of the next business day.

## 2025-08-04 RX ORDER — BUPROPION HYDROCHLORIDE 150 MG/1
150 TABLET, EXTENDED RELEASE ORAL 2 TIMES DAILY
Qty: 180 TABLET | Refills: 1 | Status: SHIPPED | OUTPATIENT
Start: 2025-08-04

## 2025-08-04 RX ORDER — MECLIZINE HYDROCHLORIDE 25 MG/1
25 TABLET ORAL EVERY 8 HOURS PRN
Qty: 90 TABLET | Refills: 1 | Status: SHIPPED | OUTPATIENT
Start: 2025-08-04

## 2025-08-05 ENCOUNTER — APPOINTMENT (OUTPATIENT)
Dept: RADIOLOGY | Facility: HOSPITAL | Age: 75
End: 2025-08-05
Payer: MEDICARE

## 2025-08-05 ENCOUNTER — HOSPITAL ENCOUNTER (EMERGENCY)
Facility: HOSPITAL | Age: 75
Discharge: HOME | End: 2025-08-05
Payer: MEDICARE

## 2025-08-05 VITALS
TEMPERATURE: 98.8 F | SYSTOLIC BLOOD PRESSURE: 143 MMHG | HEIGHT: 60 IN | WEIGHT: 271.17 LBS | HEART RATE: 57 BPM | RESPIRATION RATE: 16 BRPM | BODY MASS INDEX: 53.24 KG/M2 | DIASTOLIC BLOOD PRESSURE: 66 MMHG | OXYGEN SATURATION: 99 %

## 2025-08-05 DIAGNOSIS — M54.2 CERVICAL PAIN (NECK): ICD-10-CM
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
## 2025-08-05 DIAGNOSIS — M25.519 ACUTE SHOULDER PAIN, UNSPECIFIED LATERALITY: ICD-10-CM

## 2025-08-05 DIAGNOSIS — M54.50 ACUTE LOW BACK PAIN, UNSPECIFIED BACK PAIN LATERALITY, UNSPECIFIED WHETHER SCIATICA PRESENT: ICD-10-CM

## 2025-08-05 PROCEDURE — 72125 CT NECK SPINE W/O DYE: CPT | Performed by: RADIOLOGY

## 2025-08-05 PROCEDURE — 71045 X-RAY EXAM CHEST 1 VIEW: CPT

## 2025-08-05 PROCEDURE — 72131 CT LUMBAR SPINE W/O DYE: CPT | Performed by: RADIOLOGY

## 2025-08-05 PROCEDURE — 72131 CT LUMBAR SPINE W/O DYE: CPT

## 2025-08-05 PROCEDURE — 71045 X-RAY EXAM CHEST 1 VIEW: CPT | Performed by: RADIOLOGY

## 2025-08-05 PROCEDURE — 73030 X-RAY EXAM OF SHOULDER: CPT | Mod: LT

## 2025-08-05 PROCEDURE — 2500000001 HC RX 250 WO HCPCS SELF ADMINISTERED DRUGS (ALT 637 FOR MEDICARE OP): Performed by: PHYSICIAN ASSISTANT

## 2025-08-05 PROCEDURE — 73030 X-RAY EXAM OF SHOULDER: CPT | Mod: LEFT SIDE | Performed by: RADIOLOGY

## 2025-08-05 PROCEDURE — 2500000004 HC RX 250 GENERAL PHARMACY W/ HCPCS (ALT 636 FOR OP/ED): Performed by: PHYSICIAN ASSISTANT

## 2025-08-05 PROCEDURE — 99284 EMERGENCY DEPT VISIT MOD MDM: CPT | Mod: 25

## 2025-08-05 PROCEDURE — 72125 CT NECK SPINE W/O DYE: CPT

## 2025-08-05 RX ORDER — PREDNISONE 20 MG/1
60 TABLET ORAL ONCE
Status: COMPLETED | OUTPATIENT
Start: 2025-08-05 | End: 2025-08-05

## 2025-08-05 RX ORDER — ACETAMINOPHEN 325 MG/1
975 TABLET ORAL ONCE
Status: COMPLETED | OUTPATIENT
Start: 2025-08-05 | End: 2025-08-05

## 2025-08-05 RX ORDER — TRAMADOL HYDROCHLORIDE 50 MG/1
50 TABLET, FILM COATED ORAL EVERY 6 HOURS PRN
Qty: 10 TABLET | Refills: 0 | Status: SHIPPED | OUTPATIENT
Start: 2025-08-05 | End: 2025-08-08

## 2025-08-05 RX ORDER — TRAMADOL HYDROCHLORIDE 50 MG/1
50 TABLET, FILM COATED ORAL ONCE
Status: COMPLETED | OUTPATIENT
Start: 2025-08-05 | End: 2025-08-05

## 2025-08-05 RX ORDER — METHOCARBAMOL 500 MG/1
500 TABLET, FILM COATED ORAL 2 TIMES DAILY
Qty: 20 TABLET | Refills: 0 | Status: SHIPPED | OUTPATIENT
Start: 2025-08-05 | End: 2025-08-15

## 2025-08-05 RX ORDER — ACETAMINOPHEN 325 MG/1
650 TABLET ORAL EVERY 6 HOURS PRN
Qty: 30 TABLET | Refills: 0 | Status: SHIPPED | OUTPATIENT
Start: 2025-08-05 | End: 2025-08-15

## 2025-08-05 RX ORDER — METHYLPREDNISOLONE 4 MG/1
TABLET ORAL
Qty: 21 TABLET | Refills: 0 | Status: SHIPPED | OUTPATIENT
Start: 2025-08-05 | End: 2025-08-12

## 2025-08-05 RX ADMIN — PREDNISONE 60 MG: 20 TABLET ORAL at 16:46

## 2025-08-05 RX ADMIN — TRAMADOL HYDROCHLORIDE 50 MG: 50 TABLET, COATED ORAL at 16:49

## 2025-08-05 RX ADMIN — ACETAMINOPHEN 975 MG: 325 TABLET ORAL at 16:48

## 2025-08-05 ASSESSMENT — PAIN DESCRIPTION - ORIENTATION: ORIENTATION: LEFT;LOWER

## 2025-08-05 ASSESSMENT — PAIN DESCRIPTION - DESCRIPTORS: DESCRIPTORS: SHARP

## 2025-08-05 ASSESSMENT — PAIN SCALES - GENERAL: PAINLEVEL_OUTOF10: 10 - WORST POSSIBLE PAIN

## 2025-08-05 ASSESSMENT — PAIN - FUNCTIONAL ASSESSMENT: PAIN_FUNCTIONAL_ASSESSMENT: 0-10

## 2025-08-05 ASSESSMENT — PAIN DESCRIPTION - LOCATION: LOCATION: BACK

## 2025-08-05 ASSESSMENT — PAIN DESCRIPTION - FREQUENCY: FREQUENCY: CONSTANT/CONTINUOUS

## 2025-08-05 NOTE — ED PROVIDER NOTES
HPI   Chief Complaint   Patient presents with    Back Pain     Had a fall on Wednesday, states she had lost her balance,  Landed on back, did hit her head. Denies loc or blood thinner use. Now reports left sided lower back pain. Ambulatory.        74-year-old female presented emergency department with a chief complaint of mechanical trip and fall that occurred several days ago.  She denies head injury denies loss of consciousness.  She complains of pain in her neck and her low back in addition to her left shoulder.  She has been using over-the-counter medicine with little relief.  She denies chest pain, shortness of breath.  She denies lightheadedness, dizziness.  No other injury or trauma.  No other complaint.              Patient History   Medical History[1]  Surgical History[2]  Family History[3]  Social History[4]    Physical Exam   ED Triage Vitals [08/05/25 1339]   Temperature Heart Rate Respirations BP   37.2 °C (99 °F) 81 17 166/82      Pulse Ox Temp Source Heart Rate Source Patient Position   98 % Temporal Monitor Sitting      BP Location FiO2 (%)     Right arm --       Physical Exam  Vitals and nursing note reviewed.   Constitutional:       Appearance: Normal appearance.   HENT:      Head: Normocephalic.      Nose: Nose normal.      Mouth/Throat:      Mouth: Mucous membranes are moist.     Cardiovascular:      Rate and Rhythm: Normal rate and regular rhythm.      Pulses: Normal pulses.      Heart sounds: Normal heart sounds.   Pulmonary:      Effort: Pulmonary effort is normal.      Breath sounds: Normal breath sounds.   Abdominal:      General: Abdomen is flat.     Musculoskeletal:         General: Normal range of motion.      Cervical back: Normal range of motion.      Comments: Paracervical tenderness, paralumbar tenderness, no focal midline spinal tenderness, full range of motion of upper and lower extremity     Skin:     General: Skin is warm.     Neurological:      General: No focal deficit present.       Mental Status: She is alert and oriented to person, place, and time.     Psychiatric:         Mood and Affect: Mood normal.         Behavior: Behavior normal.           ED Course & MDM   Diagnoses as of 08/05/25 1632   Fall, initial encounter   Acute low back pain, unspecified back pain laterality, unspecified whether sciatica present   Cervical pain (neck)   Acute shoulder pain, unspecified laterality                 No data recorded     Jose M Coma Scale Score: 15 (08/05/25 1341 : Bryant Light, STARLA)                           Medical Decision Making  I have seen and evaluated this patient.  Physician available for consultation.  Vital signs have been reviewed.  All laboratory and diagnostic imaging is reviewed by myself and interpreted by myself unless otherwise stated.  Additionally imaging is interpreted by radiologist.    Imaging studies are negative for acute osseous abnormality.  Placed on pain control medication.  Given physical therapy referral.  Released in stable condition from emergent standpoint with outpatient follow-up.    Labs Reviewed - No data to display  CT cervical spine wo IV contrast   Final Result    1. No acute osseous abnormality cervical spine          2. Spinal fusion C4 through C6 with findings stable.          3. Moderate canal stenosis C4/5 through C6/7 with mild canal stenosis    C3/4. Foraminal narrowing as described above.                MACRO:    None          Signed by: Mckenna Goel 8/5/2025 3:37 PM    Dictation workstation:   [id number]     CT lumbar spine wo IV contrast   Final Result    1. No acute osseous abnormality of the lumbar spine. No compression    deformity demonstrated. No evidence for pars defect.          2. Severe narrowing thecal sac L4/5 in relation of the disc, facet    and ligamentum flavum hypertrophy.                MACRO:    None          Signed by: Mckenna Goel 8/5/2025 3:46 PM    Dictation workstation:   [id number]     XR shoulder left  2+ views   Final Result    1. No acute osseous left shoulder. Mild osteoarthritic degenerative    change noted                MACRO:    None          Signed by: Mckenna Goel 8/5/2025 3:47 PM    Dictation workstation:   [id number]     XR chest 1 view   Final Result    1. No acute process.          Signed by: Mckenna Goel 8/5/2025 3:31 PM    Dictation workstation:   [id number]     Medications  acetaminophen (Tylenol) tablet 975 mg (975 mg oral Given 8/5/25 1648)  traMADol (Ultram) tablet 50 mg (50 mg oral Given 8/5/25 1649)  predniSONE (Deltasone) tablet 60 mg (60 mg oral Given 8/5/25 1646)  Discharge Medication List as of 8/5/2025  4:45 PM    START taking these medications    !! acetaminophen (Tylenol) 325 mg tablet  Take 2 tablets (650 mg) by mouth every 6 hours if needed for mild pain (1 - 3) for up to 10 days., Starting Tue 8/5/2025, Until Fri 8/15/2025 at 2359, Normal    methocarbamol (Robaxin) 500 mg tablet  Take 1 tablet (500 mg) by mouth 2 times a day for 10 days., Starting Tue 8/5/2025, Until Fri 8/15/2025, Normal    methylPREDNISolone (Medrol Dospak) 4 mg tablets  Follow schedule on package instructions, Normal    traMADol (Ultram) 50 mg tablet  Take 1 tablet (50 mg) by mouth every 6 hours if needed for severe pain (7 - 10) for up to 3 days., Starting Tue 8/5/2025, Until Fri 8/8/2025 at 2359, Normal    !! - Potential duplicate medications found. Please discuss with provider.                  Procedure  Procedures         [1]   Past Medical History:  Diagnosis Date    Acute bronchitis, unspecified 04/18/2022    Acute bronchitis and bronchiolitis    Bariatric surgery status 10/28/2020    Bariatric surgery status    Other chest pain 04/18/2022    Chest pressure    Other conditions influencing health status 04/18/2022    History of cough    Personal history of malignant melanoma of skin     History of malignant melanoma    Personal history of other specified conditions 06/07/2021    History of  lymphadenopathy    Personal history of other specified conditions 03/18/2022    History of hoarseness   [2]   Past Surgical History:  Procedure Laterality Date    ADENOIDECTOMY  02/01/2018    Adenoidectomy    APPENDECTOMY  04/10/2014    Appendectomy    BREAST BIOPSY Bilateral     pt states hx of bilat benign bxs    BREAST LUMPECTOMY  04/10/2014    Breast Surgery Lumpectomy    CHOLECYSTECTOMY  04/10/2014    Cholecystectomy    CT GUIDED IMAGING FOR NEEDLE PLACEMENT  06/11/2012    CT GUIDED IMAGING FOR NEEDLE PLACEMENT [facility]    DILATION AND CURETTAGE OF UTERUS  04/10/2014    Dilation And Curettage    HEMICOLECTOMY  04/10/2014    Hemicolectomy    HERNIA REPAIR  04/10/2014    Hernia Repair    LYMPH NODE BIOPSY  04/10/2014    Biopsy Lymph Node    OTHER SURGICAL HISTORY  04/10/2014    Excision Of Lesion Trunk    OTHER SURGICAL HISTORY  04/10/2014    Excision Of Lesion Ears Benign Over 4cm    OTHER SURGICAL HISTORY  08/10/2021    Cervical surgery    OTHER SURGICAL HISTORY  02/16/2022    Cataract surgery    SMALL INTESTINE SURGERY  04/10/2014    Small Bowel Resection    TONSILLECTOMY  04/10/2014    Tonsillectomy    TOTAL KNEE ARTHROPLASTY  04/10/2014    Knee Replacement   [3]   Family History  Problem Relation Name Age of Onset    Uterine cancer Mother      Ovarian cancer Mother      Alzheimer's disease Mother      Anxiety disorder Father      Skin cancer Father      Alzheimer's disease Father      Breast cancer Sister  35    Alzheimer's disease Sister      Ovarian cancer Maternal Grandmother     [4]   Social History  Tobacco Use    Smoking status: Never     Passive exposure: Never    Smokeless tobacco: Never   Vaping Use    Vaping status: Never Used   Substance Use Topics    Alcohol use: Never    Drug use: Never        Ton Anderson PA-C  08/05/25 7972

## 2025-08-19 ENCOUNTER — EVALUATION (OUTPATIENT)
Dept: PHYSICAL THERAPY | Facility: CLINIC | Age: 75
End: 2025-08-19
Payer: MEDICARE

## 2025-08-19 DIAGNOSIS — R29.6 FREQUENT FALLS: ICD-10-CM

## 2025-08-19 DIAGNOSIS — M54.50 LOW BACK PAIN: Primary | ICD-10-CM

## 2025-08-19 PROCEDURE — 97110 THERAPEUTIC EXERCISES: CPT | Mod: GP

## 2025-08-19 PROCEDURE — 97161 PT EVAL LOW COMPLEX 20 MIN: CPT | Mod: GP

## 2025-08-20 ENCOUNTER — RESULTS FOLLOW-UP (OUTPATIENT)
Dept: NEUROLOGY | Facility: CLINIC | Age: 75
End: 2025-08-20
Payer: MEDICARE

## 2025-08-20 DIAGNOSIS — E03.9 ACQUIRED HYPOTHYROIDISM: ICD-10-CM

## 2025-08-20 LAB
25(OH)D3+25(OH)D2 SERPL-MCNC: 41 NG/ML (ref 30–100)
ALBUMIN SERPL-MCNC: 3.8 G/DL (ref 3.6–5.1)
ALP SERPL-CCNC: 108 U/L (ref 37–153)
ALT SERPL-CCNC: 17 U/L (ref 6–29)
ANION GAP SERPL CALCULATED.4IONS-SCNC: 9 MMOL/L (CALC) (ref 7–17)
AST SERPL-CCNC: 16 U/L (ref 10–35)
BASOPHILS # BLD AUTO: 18 CELLS/UL (ref 0–200)
BASOPHILS NFR BLD AUTO: 0.3 %
BILIRUB SERPL-MCNC: 0.4 MG/DL (ref 0.2–1.2)
BUN SERPL-MCNC: 19 MG/DL (ref 7–25)
CALCIUM SERPL-MCNC: 8.8 MG/DL (ref 8.6–10.4)
CHLORIDE SERPL-SCNC: 105 MMOL/L (ref 98–110)
CO2 SERPL-SCNC: 27 MMOL/L (ref 20–32)
CREAT SERPL-MCNC: 0.94 MG/DL (ref 0.6–1)
EGFRCR SERPLBLD CKD-EPI 2021: 63 ML/MIN/1.73M2
EOSINOPHIL # BLD AUTO: 277 CELLS/UL (ref 15–500)
EOSINOPHIL NFR BLD AUTO: 4.7 %
ERYTHROCYTE [DISTWIDTH] IN BLOOD BY AUTOMATED COUNT: 13 % (ref 11–15)
GLUCOSE SERPL-MCNC: 92 MG/DL (ref 65–139)
HCT VFR BLD AUTO: 38.5 % (ref 35–45)
HGB BLD-MCNC: 12.4 G/DL (ref 11.7–15.5)
LYMPHOCYTES # BLD AUTO: 1375 CELLS/UL (ref 850–3900)
LYMPHOCYTES NFR BLD AUTO: 23.3 %
MCH RBC QN AUTO: 29.4 PG (ref 27–33)
MCHC RBC AUTO-ENTMCNC: 32.2 G/DL (ref 32–36)
MCV RBC AUTO: 91.2 FL (ref 80–100)
MONOCYTES # BLD AUTO: 531 CELLS/UL (ref 200–950)
MONOCYTES NFR BLD AUTO: 9 %
NEUTROPHILS # BLD AUTO: 3699 CELLS/UL (ref 1500–7800)
NEUTROPHILS NFR BLD AUTO: 62.7 %
PLATELET # BLD AUTO: 172 THOUSAND/UL (ref 140–400)
PMV BLD REES-ECKER: 10.1 FL (ref 7.5–12.5)
POTASSIUM SERPL-SCNC: 4.4 MMOL/L (ref 3.5–5.3)
PROT SERPL-MCNC: 6.1 G/DL (ref 6.1–8.1)
RBC # BLD AUTO: 4.22 MILLION/UL (ref 3.8–5.1)
SODIUM SERPL-SCNC: 141 MMOL/L (ref 135–146)
T4 FREE SERPL-MCNC: 0.9 NG/DL (ref 0.8–1.8)
TSH SERPL-ACNC: 4.61 MIU/L (ref 0.4–4.5)
VIT B12 SERPL-MCNC: 487 PG/ML (ref 200–1100)
WBC # BLD AUTO: 5.9 THOUSAND/UL (ref 3.8–10.8)

## 2025-08-20 RX ORDER — LEVOTHYROXINE SODIUM 88 UG/1
88 TABLET ORAL DAILY
Qty: 30 TABLET | Refills: 11 | Status: SHIPPED | OUTPATIENT
Start: 2025-08-20 | End: 2026-08-20

## 2025-08-22 ENCOUNTER — TREATMENT (OUTPATIENT)
Dept: PHYSICAL THERAPY | Facility: CLINIC | Age: 75
End: 2025-08-22
Payer: MEDICARE

## 2025-08-22 DIAGNOSIS — R29.6 FREQUENT FALLS: ICD-10-CM

## 2025-08-22 DIAGNOSIS — M54.50 LOW BACK PAIN: ICD-10-CM

## 2025-08-22 PROCEDURE — 97110 THERAPEUTIC EXERCISES: CPT | Mod: GP

## 2025-08-27 ENCOUNTER — APPOINTMENT (OUTPATIENT)
Dept: PHYSICAL THERAPY | Facility: CLINIC | Age: 75
End: 2025-08-27
Payer: MEDICARE

## 2025-08-27 DIAGNOSIS — R29.6 FREQUENT FALLS: ICD-10-CM

## 2025-08-27 DIAGNOSIS — M54.50 LOW BACK PAIN: Primary | ICD-10-CM

## 2025-08-29 ENCOUNTER — TREATMENT (OUTPATIENT)
Dept: PHYSICAL THERAPY | Facility: CLINIC | Age: 75
End: 2025-08-29
Payer: MEDICARE

## 2025-08-29 DIAGNOSIS — R29.6 FREQUENT FALLS: ICD-10-CM

## 2025-08-29 DIAGNOSIS — M54.50 LOW BACK PAIN: ICD-10-CM

## 2025-08-29 PROCEDURE — 97110 THERAPEUTIC EXERCISES: CPT | Mod: GP

## 2025-09-04 DIAGNOSIS — G25.81 RESTLESS LEGS SYNDROME: ICD-10-CM

## 2025-09-04 PROBLEM — R29.6 FREQUENT FALLS: Status: ACTIVE | Noted: 2025-09-04

## 2025-09-04 RX ORDER — ROPINIROLE 3 MG/1
3 TABLET, FILM COATED ORAL 3 TIMES DAILY
Qty: 270 TABLET | Refills: 1 | Status: SHIPPED | OUTPATIENT
Start: 2025-09-04

## 2025-11-12 ENCOUNTER — APPOINTMENT (OUTPATIENT)
Dept: GASTROENTEROLOGY | Facility: CLINIC | Age: 75
End: 2025-11-12
Payer: MEDICARE